# Patient Record
Sex: FEMALE | Race: WHITE | NOT HISPANIC OR LATINO | URBAN - METROPOLITAN AREA
[De-identification: names, ages, dates, MRNs, and addresses within clinical notes are randomized per-mention and may not be internally consistent; named-entity substitution may affect disease eponyms.]

---

## 2020-01-16 ENCOUNTER — TRANSFERRED RECORDS (OUTPATIENT)
Dept: HEALTH INFORMATION MANAGEMENT | Facility: CLINIC | Age: 75
End: 2020-01-16

## 2020-02-27 ENCOUNTER — APPOINTMENT (OUTPATIENT)
Age: 75
Setting detail: DERMATOLOGY
End: 2020-02-27

## 2020-02-27 DIAGNOSIS — L81.4 OTHER MELANIN HYPERPIGMENTATION: ICD-10-CM

## 2020-02-27 DIAGNOSIS — L57.8 OTHER SKIN CHANGES DUE TO CHRONIC EXPOSURE TO NONIONIZING RADIATION: ICD-10-CM

## 2020-02-27 DIAGNOSIS — L72.11 PILAR CYST: ICD-10-CM

## 2020-02-27 DIAGNOSIS — L57.0 ACTINIC KERATOSIS: ICD-10-CM

## 2020-02-27 DIAGNOSIS — D22 MELANOCYTIC NEVI: ICD-10-CM

## 2020-02-27 DIAGNOSIS — L82.1 OTHER SEBORRHEIC KERATOSIS: ICD-10-CM

## 2020-02-27 DIAGNOSIS — D18.0 HEMANGIOMA: ICD-10-CM

## 2020-02-27 PROBLEM — D18.01 HEMANGIOMA OF SKIN AND SUBCUTANEOUS TISSUE: Status: ACTIVE | Noted: 2020-02-27

## 2020-02-27 PROBLEM — D22.5 MELANOCYTIC NEVI OF TRUNK: Status: ACTIVE | Noted: 2020-02-27

## 2020-02-27 PROCEDURE — OTHER COUNSELING: OTHER

## 2020-02-27 PROCEDURE — 17004 DESTROY PREMAL LESIONS 15/>: CPT

## 2020-02-27 PROCEDURE — 99214 OFFICE O/P EST MOD 30 MIN: CPT | Mod: 25

## 2020-02-27 PROCEDURE — OTHER LIQUID NITROGEN: OTHER

## 2020-02-27 ASSESSMENT — LOCATION DETAILED DESCRIPTION DERM
LOCATION DETAILED: LEFT CENTRAL PARIETAL SCALP
LOCATION DETAILED: LEFT MEDIAL UPPER BACK
LOCATION DETAILED: INFERIOR THORACIC SPINE
LOCATION DETAILED: SUPERIOR THORACIC SPINE
LOCATION DETAILED: RIGHT RADIAL DORSAL HAND
LOCATION DETAILED: LEFT PROXIMAL DORSAL FOREARM
LOCATION DETAILED: LEFT FOREHEAD
LOCATION DETAILED: RIGHT PROXIMAL DORSAL FOREARM
LOCATION DETAILED: RIGHT SUPERIOR HELIX
LOCATION DETAILED: RIGHT SUPERIOR CRUS OF ANTIHELIX

## 2020-02-27 ASSESSMENT — LOCATION SIMPLE DESCRIPTION DERM
LOCATION SIMPLE: LEFT FOREARM
LOCATION SIMPLE: LEFT UPPER BACK
LOCATION SIMPLE: RIGHT FOREARM
LOCATION SIMPLE: LEFT FOREHEAD
LOCATION SIMPLE: UPPER BACK
LOCATION SIMPLE: RIGHT EAR
LOCATION SIMPLE: SCALP
LOCATION SIMPLE: RIGHT HAND

## 2020-02-27 ASSESSMENT — LOCATION ZONE DERM
LOCATION ZONE: HAND
LOCATION ZONE: SCALP
LOCATION ZONE: ARM
LOCATION ZONE: EAR
LOCATION ZONE: FACE
LOCATION ZONE: TRUNK

## 2020-02-27 NOTE — PROCEDURE: LIQUID NITROGEN
Render Post-Care Instructions In Note?: no
Detail Level: Zone
Duration Of Freeze Thaw-Cycle (Seconds): 0
Consent: The patient's consent was obtained including but not limited to risks of crusting, scabbing, blistering, scarring, darker or lighter pigmentary change, recurrence, incomplete removal and infection.
Total Number Of Aks Treated: 8
Post-Care Instructions: I reviewed with the patient in detail post-care instructions. Patient is to wear sunprotection, and avoid picking at any of the treated lesions. Pt may apply Vaseline to crusted or scabbing areas.

## 2020-02-27 NOTE — PROCEDURE: COUNSELING
Detail Level: Zone
Detail Level: Generalized
Detail Level: Detailed
Patient Specific Counseling (Will Not Stick From Patient To Patient): 8 lesions treated as noted below

## 2020-05-19 ENCOUNTER — TRANSFERRED RECORDS (OUTPATIENT)
Dept: HEALTH INFORMATION MANAGEMENT | Facility: CLINIC | Age: 75
End: 2020-05-19

## 2020-05-28 ENCOUNTER — TRANSFERRED RECORDS (OUTPATIENT)
Dept: HEALTH INFORMATION MANAGEMENT | Facility: CLINIC | Age: 75
End: 2020-05-28

## 2020-05-28 ENCOUNTER — MEDICAL CORRESPONDENCE (OUTPATIENT)
Dept: HEALTH INFORMATION MANAGEMENT | Facility: CLINIC | Age: 75
End: 2020-05-28

## 2020-06-04 ENCOUNTER — VIRTUAL VISIT (OUTPATIENT)
Dept: NEUROLOGY | Facility: CLINIC | Age: 75
End: 2020-06-04
Payer: COMMERCIAL

## 2020-06-04 ENCOUNTER — ANCILLARY PROCEDURE (OUTPATIENT)
Dept: NEUROLOGY | Facility: CLINIC | Age: 75
End: 2020-06-04
Payer: COMMERCIAL

## 2020-06-04 DIAGNOSIS — G40.919 INTRACTABLE EPILEPSY WITHOUT STATUS EPILEPTICUS, UNSPECIFIED EPILEPSY TYPE (H): Primary | ICD-10-CM

## 2020-06-04 DIAGNOSIS — R40.4 TRANSIENT ALTERATION OF AWARENESS: ICD-10-CM

## 2020-06-04 RX ORDER — LEVETIRACETAM 250 MG/1
TABLET ORAL
Qty: 150 TABLET | Refills: 11 | Status: SHIPPED | OUTPATIENT
Start: 2020-06-04 | End: 2020-09-16

## 2020-06-04 NOTE — LETTER
Date:June 5, 2020      Patient was self referred, no letter generated. Do not send.        [facility] Health Information

## 2020-06-04 NOTE — LETTER
"2020       RE: Kindra Holt  : 1945   MRN: [id number]      Dear Colleague,    Thank you for referring your patient, Kindra Holt, to the [facility]PI[facility] at [facility]. Please see a copy of my visit note below.    Kindra Holt is a 74 year old female who is being evaluated via a billable video visit.      The patient has been notified of following:     \"This video visit will be conducted via a call between you and your physician/provider. We have found that certain health care needs can be provided without the need for an in-person physical exam.  This service lets us provide the care you need with a video conversation.  If a prescription is necessary we can send it directly to your pharmacy.  If lab work is needed we can place an order for that and you can then stop by our lab to have the test done at a later time.    Video visits are billed at different rates depending on your insurance coverage.  Please reach out to your insurance provider with any questions.    If during the course of the call the physician/provider feels a video visit is not appropriate, you will not be charged for this service.\"    Patient has given verbal consent for Video visit? Yes    How would you like to obtain your AVS? Mail a copy    Patient would like the video invitation sent by: Send to e-mail at: [email or web address]  Will anyone else be joining your video visit? Yes: [email or web address]. How would they like to receive their invitation? Send to e-mail at:  [email or web address]               Video-Visit Details    Type of service:  Video Visit    Video Start Time: 2:49 PM  Video End Time: 3:34 PM    Originating Location (pt. Location): Home    Distant Location (provider location):  [facility]     Platform used for Video Visit: Mar Frazier MD      [facility] History and Physical       Patient:  Kindra Holt  :  1945   Age:  " "74 year old   Today's Office Visit:  6/4/2020    Referring Provider:    Provider Not In System    History of Present Illness:  Visit with Alexsandra (home lead). Kindra is a 73 y/o RH with history of epilepsy. Alexsandra does not know details of epilepsy history. Dr. Graham at [place] was her doctor. She continues to have recurrent episodes. She moved into current home 10/30/2019. Prior to 10.2019 she lived alone at an assisted living facility. Her brother (guardian) may know her epilepsy history. Per Geovany her seizure started at age 65, early on seizure semiology are unknown per brother. She has only had levetiracetam for antiepileptic drug.     At new home she had a confusion spell on 11/2019, second spell was 2/2020 once per month, then spells have worsened in the last 2 months.  In the last 2 months she had 3 spells total, lasting several hours. Last spell was  May 21, 2020.  She started levetiracetam before she moved to the home.   Spell Type 1: These are described as  \"odd spells, vomited several times, dud not respond, eyes were closed, increased drooling, very confused, did not follow commands, she is not able remember how to turn on faucet, speech slurred\".  No clear warning sign. These spells last several hours to most of the day, periods of severe confusion are hours in duration. No fevers, no hypoglycemia, BG is around 129 when the check at home, BP was stable, no chest pain, no shortness of breath.   These occur early in the morning.   Epilepsy Risk Factors:  No Stroke, Encephalitis, Meningitis, Brain Tumor and Head Trauma  Precipitating factors:   NONE  Past medical history: DM 2, CKD, Epilepsy (we do not know details). No history of surgery.   Family History:  None    Social: Lives at [facility] (group home for adults), no smoking, no drugs, no alcohol. Highest level of education 8th grade. brother is her guardian.  Driving:  Currently patient is:  Not driving.  Female:  Menopause, no hot flashes, no " night sweats    Previous Evaluations for Epilepsy: Past workup was EKG, Ziopatch 2 weeks had no cardiac abnormality, MRI brain and EEG: We do not results.   Review of Systems:  Lethargy / Tiredness:  No  Nausea / Vomiting:  No  Double Vision:  No  Sleepiness:  No  Depression:  No  Slowed Cognitive Function:  No  Memory Problems:  yes  Poor Balance:  No  Dizziness:  No  Appetite Changes:  No  Blurred Vision:  No  Decreased Libido:  No  Sleep Changes:  No  Behavioral Changes:  No  Skin: negative  Respiratory: No shortness of breath, dyspnea on exertion, cough, or hemoptysis  Cardiovascular: negative  Have you experienced a traumatic fall related to your events: No  Are these falls related to your seizures: No     Medication Notes:   AED Medication Compliance:  compliant most of the time  Using a pill box:  Medications are given to her by staff    Exam: Alert, slow to answer questions, orientated, speech is fluent, face symmetric, tongue midline, extra ocular movements in tact, no pronator drip, arm circumduction is symmetric, finger to nose normal.     Impression:   Recurrent stereotyped spells concerning for focal impaired seizure   History of epilepsy   History of DM and CKD    Discussion: 74 year old RHF with history of epilepsy, DM, and CKD new to our clinic and group home, she has been on levetiracetam for a long time and has a diagnosis of epilepsy. We do not have details of her prior medical history. She continues to have spells of confusion, which I suspect are seizures. We will increase levetiracetam 500-750. Most spells are in the early morning. She had a cardiac work up (EKG and ziopatch with no concerning abnormalities to explain her spells). EEG today 6/4/2020 was normal. We need to get past medical records to better understand her history.     Plan:   Get levetiracetam level   Increase levetiracetam 500-750   Dr. Graham at [place] need records.       Deneen Frazier MD   Epilepsy Staff    Again, thank  you for allowing me to participate in the care of your patient.      Sincerely,    Deneen Frazier MD

## 2020-06-04 NOTE — PROGRESS NOTES
"Kindra Holt is a 74 year old female who is being evaluated via a billable video visit.      The patient has been notified of following:     \"This video visit will be conducted via a call between you and your physician/provider. We have found that certain health care needs can be provided without the need for an in-person physical exam.  This service lets us provide the care you need with a video conversation.  If a prescription is necessary we can send it directly to your pharmacy.  If lab work is needed we can place an order for that and you can then stop by our lab to have the test done at a later time.    Video visits are billed at different rates depending on your insurance coverage.  Please reach out to your insurance provider with any questions.    If during the course of the call the physician/provider feels a video visit is not appropriate, you will not be charged for this service.\"    Patient has given verbal consent for Video visit? Yes    How would you like to obtain your AVS? Mail a copy    Patient would like the video invitation sent by: Send to e-mail at: [email or web address]  Will anyone else be joining your video visit? Yes: [email or web address]. How would they like to receive their invitation? Send to e-mail at:  [email or web address]               Video-Visit Details    Type of service:  Video Visit    Video Start Time: 2:49 PM  Video End Time: 3:34 PM    Originating Location (pt. Location): Home    Distant Location (provider location):  [facility] EPILEPS[facility]     Platform used for Video Visit: Mar Frazier MD      [facility] Epilepsy Care History and Physical       Patient:  Kindra Holt  :  1945   Age:  74 year old   Today's Office Visit:  2020    Referring Provider:    Provider Not In System    History of Present Illness:  Visit with Alexsandra (home lead). Kindra is a 75 y/o RH with history of epilepsy. Alexsandra does not know details of epilepsy history. Dr. Graham at " [facility] was her doctor. She continues to have recurrent episodes. She moved into current home 10/30/2019. Prior to 10.2019 she lived alone at an assisted living facility. Her brother (guardian) may know her epilepsy history. Per Geovany her seizure started at age 65, early on seizure semiology are unknown per brother. She has only had levetiracetam for antiepileptic drug.     At new home she had a confusion spell on 11/2019, second spell was 2/2020 once per month, then spells have worsened in the last 2 months.  In the last 2 months she had 3 spells total, lasting several hours. Last spell was  May 21, 2020.  She started levetiracetam before she moved to the home.   Spell Type 1: These are described as  \"odd spells, vomited several times, dud not respond, eyes were closed, increased drooling, very confused, did not follow commands, she is not able remember how to turn on faucet, speech slurred\".  No clear warning sign. These spells last several hours to most of the day, periods of severe confusion are hours in duration. No fevers, no hypoglycemia, BG is around 129 when the check at home, BP was stable, no chest pain, no shortness of breath.   These occur early in the morning.   Epilepsy Risk Factors:  No Stroke, Encephalitis, Meningitis, Brain Tumor and Head Trauma  Precipitating factors:   NONE  Past medical history: DM 2, CKD, Epilepsy (we do not know details). No history of surgery.   Family History:  None    Social: Lives at [facility] (group home for adults), no smoking, no drugs, no alcohol. Highest level of education 8th grade. brother is her guardian.  Driving:  Currently patient is:  Not driving.  Female:  Menopause, no hot flashes, no night sweats    Previous Evaluations for Epilepsy: Past workup was EKG, Ziopatch 2 weeks had no cardiac abnormality, MRI brain and EEG: We do not results.   Review of Systems:  Lethargy / Tiredness:  No  Nausea / Vomiting:  No  Double Vision:  No  Sleepiness:  " No  Depression:  No  Slowed Cognitive Function:  No  Memory Problems:  yes  Poor Balance:  No  Dizziness:  No  Appetite Changes:  No  Blurred Vision:  No  Decreased Libido:  No  Sleep Changes:  No  Behavioral Changes:  No  Skin: negative  Respiratory: No shortness of breath, dyspnea on exertion, cough, or hemoptysis  Cardiovascular: negative  Have you experienced a traumatic fall related to your events: No  Are these falls related to your seizures: No     Medication Notes:   AED Medication Compliance:  compliant most of the time  Using a pill box:  Medications are given to her by staff    Exam: Alert, slow to answer questions, orientated, speech is fluent, face symmetric, tongue midline, extra ocular movements in tact, no pronator drip, arm circumduction is symmetric, finger to nose normal.     Impression:   Recurrent stereotyped spells concerning for focal impaired seizure   History of epilepsy   History of DM and CKD    Discussion: 74 year old RHF with history of epilepsy, DM, and CKD new to our clinic and group home, she has been on levetiracetam for a long time and has a diagnosis of epilepsy. We do not have details of her prior medical history. She continues to have spells of confusion, which I suspect are seizures. We will increase levetiracetam 500-750. Most spells are in the early morning. She had a cardiac work up (EKG and ziopatch with no concerning abnormalities to explain her spells). EEG today 6/4/2020 was normal. We need to get past medical records to better understand her history.     Plan:   Get levetiracetam level   Increase levetiracetam 500-750   Dr. Graham at [place] need records.       Deneen Frazier MD   Epilepsy Staff

## 2020-06-08 ENCOUNTER — TELEPHONE (OUTPATIENT)
Dept: NEUROLOGY | Facility: CLINIC | Age: 75
End: 2020-06-08

## 2020-06-08 NOTE — TELEPHONE ENCOUNTER
Call returned to the patient's group home. I asked that they notify us on Kindra's discharge so that we can arrange the appropriate follow up. An appointment is already scheduled later this month with Dr. Frazier, but perhaps an EEG or labs will also be needed.

## 2020-06-08 NOTE — TELEPHONE ENCOUNTER
What is the concern that needs to be addressed by a nurse? Patient was taken to the [facility] this morning for another episode. They are unsure if this is a seizure or not. They just wanted to let you know. Please call them back with questions or concerns    May a detailed message be left on voicemail? yes    Date of last office visit:     Message routed to: RN pool

## 2020-06-29 ENCOUNTER — VIRTUAL VISIT (OUTPATIENT)
Dept: NEUROLOGY | Facility: CLINIC | Age: 75
End: 2020-06-29
Payer: COMMERCIAL

## 2020-06-29 DIAGNOSIS — G40.919 INTRACTABLE EPILEPSY WITHOUT STATUS EPILEPTICUS, UNSPECIFIED EPILEPSY TYPE (H): ICD-10-CM

## 2020-06-29 DIAGNOSIS — R40.4 TRANSIENT ALTERATION OF AWARENESS: Primary | ICD-10-CM

## 2020-06-29 RX ORDER — LAMOTRIGINE 25 MG/1
TABLET ORAL
Qty: 120 TABLET | Refills: 3 | Status: SHIPPED | OUTPATIENT
Start: 2020-06-29 | End: 2020-08-28

## 2020-06-29 NOTE — LETTER
Date:June 30, 2020      Patient was self referred, no letter generated. Do not send.        [facility] Health Information

## 2020-06-29 NOTE — PATIENT INSTRUCTIONS
Continue levetiracetam 500 mg and 750 mg pm     Start lamotrigine (watch for rash, mood changes, dizziness, falls, etc)  Week 1-2: 25 mg morning   Week 3-4: 25 mg twice a day   Week 5: 50 mg and 25 mg pm   Week 6: 50 mg twice a day     Follow up  6 weeks    Deneen Frazier MD

## 2020-06-29 NOTE — LETTER
"2020       RE: Kindra Holt  : 1945   MRN: [id number]      Dear Colleague,    Thank you for referring your patient, Kindra Holt, to the [facility] EPILEPSY [facility]ARE at [facility]. Please see a copy of my visit note below.    Kindra Holt is a 74 year old female who is being evaluated via a billable video visit.      The patient has been notified of following:     \"This video visit will be conducted via a call between you and your physician/provider. We have found that certain health care needs can be provided without the need for an in-person physical exam.  This service lets us provide the care you need with a video conversation.  If a prescription is necessary we can send it directly to your pharmacy.  If lab work is needed we can place an order for that and you can then stop by our lab to have the test done at a later time.    Video visits are billed at different rates depending on your insurance coverage.  Please reach out to your insurance provider with any questions.    If during the course of the call the physician/provider feels a video visit is not appropriate, you will not be charged for this service.\"    Patient has given verbal consent for Video visit? Yes  How would you like to obtain your AVS? Mail a copy  Patient would like the video invitation sent by: Send to e-mail at: [email or web address]  Will anyone else be joining your video visit? No, pt and caregiver.     Thank you  Muriel Hebert LPN    Takes other meds for other stuff per caregiver. Would not give me the rest of the meds.     Video-Visit Details    Type of service:  Video Visit    Video Start Time: 9:50 AM  Video End Time: 10:15 AM    Originating Location (pt. Location): Home    Distant Location (provider location):  [facility] EPILEPS[facility]     Platform used for Video Visit: Mar Frazier MD    [facility] Epilepsy Progress Note       Patient:  Kindra Holt  :  " "1945   Age:  74 year old   Today's Office Visit:  6/29/2020    HPI: Joined with staff member (Luciaarash).June 6, 2020. She took her medications and fell backward, hit her head, and had a convulsion. She went to ER and was told she was dehydrated. She had a UTI and was put on a sulfa medications, she had a rash all over body. She has poor memory, misrepresents things, does her ADL out of order, more argumentative, mood is worse, more stubborn which is not her baseline. Her mood worsened with levetiracetam increase.      Spell Type 1: These are described as  \"odd spells, vomited several times, dud not respond, eyes were closed, increased drooling, very confused, did not follow commands, she is not able remember how to turn on faucet, speech slurred\".  No clear warning sign. These spells last several hours to most of the day, periods of severe confusion are hours in duration.    On this visit we spoke about patient's seizures, antiepileptic drug, and plan of epilepsy are. Patient/caregiver was agreeable with plan of care.     Prior to Admission medications    Medication Sig Start Date End Date Taking? Authorizing Provider   levETIRAcetam (KEPPRA) 250 MG tablet 500 mg morning and 750 mg pm 6/4/20  Yes Deneen Frazier MD       Review of System: No nausea, no vomiting, no rash, no significant mood changes.     Exam:   Alert, orientated, speech is fluent, face symmetric, tongue midline, extra ocular movements in tact, no pronator drip, arm circumduction is symmetric, finger to nose normal.     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  In the past she has had episodes of slurred speech and confusion which were thought to be possible TIAs.  However MRI of the brain's were unremarkable for stroke or acute changes.  She continued to have recurrent paroxysmal stereotyped spells consisting of convulsion which were thought to be focal impaired seizures.  She has been on monotherapy levetiracetam.  We increased her levetiracetam on the last visit which resulted in worsening mood.  Her mood changes have interfered with her activities of daily living.  For this reason we will start lamotrigine and titrate to tolerable doses.  Then reduce levetiracetam. She had convulsion with fall 6/6/2020.  we reviewed the side effects, which include,  but are not limited to malachi caren rash, hepatotoxicity, leukopenia, mood changes, nausea, cognitive changes, and dizziness.  They are agreeable to starting lamotrigine.      Plan:   Continue levetiracetam 500 mg and 750 mg pm     Start lamotrigine (watch for rash, mood changes, dizziness, falls, etc)  Week 1-2: 25 mg morning   Week 3-4: 25 mg twice a day   Week 5: 50 mg and 25 mg pm   Week 6: 50 mg twice a day     Follow up  6 weeks      I spent 25 minutes with the patient. During this time medical history data collection, counseling, and coordination of care exceeded 50% of the visit time. I addressed all questions the patient/caregiver raised in regards to the patient's medical care. This note was created with voice recognition software. Inadvertent grammatical errors, typographical errors, and \"sound a like\" substitutions may occur due to limitations of the software.  Read the note carefully and apply context when erroneous substitutions have occurred. Thank you.     Deneen Frazier MD                 Again, thank you for allowing me to participate in the care of your patient.      Sincerely,    Deneen Frazier MD      "

## 2020-06-29 NOTE — PROGRESS NOTES
"Kindra Holt is a 74 year old female who is being evaluated via a billable video visit.      The patient has been notified of following:     \"This video visit will be conducted via a call between you and your physician/provider. We have found that certain health care needs can be provided without the need for an in-person physical exam.  This service lets us provide the care you need with a video conversation.  If a prescription is necessary we can send it directly to your pharmacy.  If lab work is needed we can place an order for that and you can then stop by our lab to have the test done at a later time.    Video visits are billed at different rates depending on your insurance coverage.  Please reach out to your insurance provider with any questions.    If during the course of the call the physician/provider feels a video visit is not appropriate, you will not be charged for this service.\"    Patient has given verbal consent for Video visit? Yes  How would you like to obtain your AVS? Mail a copy  Patient would like the video invitation sent by: Send to e-mail at: [email or web address]  Will anyone else be joining your video visit? No, pt and caregiver.     Thank you  Muriel Hebert LPN    Takes other meds for other stuff per caregiver. Would not give me the rest of the meds.     Video-Visit Details    Type of service:  Video Visit    Video Start Time: 9:50 AM  Video End Time: 10:15 AM    Originating Location (pt. Location): Home    Distant Location (provider location):  [facility] EPILEPSY CARE     Platform used for Video Visit: Mar Frazier MD    [facility] Epilepsy Progress Note       Patient:  Kindra Holt  :  1945   Age:  74 year old   Today's Office Visit:  2020    HPI: Joined with staff member (Aden).2020. She took her medications and fell backward, hit her head, and had a convulsion. She went to ER and was told she was dehydrated. She had a UTI and was put on a " "sulfa medications, she had a rash all over body. She has poor memory, misrepresents things, does her ADL out of order, more argumentative, mood is worse, more stubborn which is not her baseline. Her mood worsened with levetiracetam increase.      Spell Type 1: These are described as  \"odd spells, vomited several times, dud not respond, eyes were closed, increased drooling, very confused, did not follow commands, she is not able remember how to turn on faucet, speech slurred\".  No clear warning sign. These spells last several hours to most of the day, periods of severe confusion are hours in duration.    On this visit we spoke about patient's seizures, antiepileptic drug, and plan of epilepsy are. Patient/caregiver was agreeable with plan of care.     Prior to Admission medications    Medication Sig Start Date End Date Taking? Authorizing Provider   levETIRAcetam (KEPPRA) 250 MG tablet 500 mg morning and 750 mg pm 6/4/20  Yes Deneen Frazier MD       Review of System: No nausea, no vomiting, no rash, no significant mood changes.     Exam:   Alert, orientated, speech is fluent, face symmetric, tongue midline, extra ocular movements in tact, no pronator drip, arm circumduction is symmetric, finger to nose normal.     Impression:   Recurrent stereotyped spells concerning for focal impaired seizure that progress to generalized tonic-clonic convulsion   History of epilepsy   History of DM and CKD  History mild cognitive impairment and mild mental retardation   History of questionable TIA (slurred speech with no acute changes on MRI brain, these may have been seizures)    Discussion:   Discussion: 74 year old RHF with history of epilepsy, DM, and CKD on monotherapy levetiracetam for diagnosis of epilepsy.  In the past she has had episodes of slurred speech and confusion which were thought to be possible TIAs.  However MRI of the brain's were unremarkable for stroke or acute changes.  She continued to have recurrent " "paroxysmal stereotyped spells consisting of convulsion which were thought to be focal impaired seizures.  She has been on monotherapy levetiracetam.  We increased her levetiracetam on the last visit which resulted in worsening mood.  Her mood changes have interfered with her activities of daily living.  For this reason we will start lamotrigine and titrate to tolerable doses.  Then reduce levetiracetam. She had convulsion with fall 6/6/2020.  we reviewed the side effects, which include,  but are not limited to malachi caren rash, hepatotoxicity, leukopenia, mood changes, nausea, cognitive changes, and dizziness.  They are agreeable to starting lamotrigine.      Plan:   Continue levetiracetam 500 mg and 750 mg pm     Start lamotrigine (watch for rash, mood changes, dizziness, falls, etc)  Week 1-2: 25 mg morning   Week 3-4: 25 mg twice a day   Week 5: 50 mg and 25 mg pm   Week 6: 50 mg twice a day     Follow up  6 weeks      I spent 25 minutes with the patient. During this time medical history data collection, counseling, and coordination of care exceeded 50% of the visit time. I addressed all questions the patient/caregiver raised in regards to the patient's medical care. This note was created with voice recognition software. Inadvertent grammatical errors, typographical errors, and \"sound a like\" substitutions may occur due to limitations of the software.  Read the note carefully and apply context when erroneous substitutions have occurred. Thank you.     Deneen Frazier MD               "

## 2020-08-12 ENCOUNTER — VIRTUAL VISIT (OUTPATIENT)
Dept: NEUROLOGY | Facility: CLINIC | Age: 75
End: 2020-08-12
Payer: COMMERCIAL

## 2020-08-12 DIAGNOSIS — G40.919 INTRACTABLE EPILEPSY WITHOUT STATUS EPILEPTICUS, UNSPECIFIED EPILEPSY TYPE (H): Primary | ICD-10-CM

## 2020-08-12 RX ORDER — SIMETHICONE 80 MG
80 TABLET,CHEWABLE ORAL 3 TIMES DAILY
COMMUNITY

## 2020-08-12 RX ORDER — SIMVASTATIN 20 MG
20 TABLET ORAL AT BEDTIME
COMMUNITY

## 2020-08-12 RX ORDER — LEVOTHYROXINE SODIUM 50 UG/1
50 TABLET ORAL DAILY
COMMUNITY
End: 2023-05-15

## 2020-08-12 RX ORDER — LISINOPRIL 40 MG/1
40 TABLET ORAL DAILY
COMMUNITY

## 2020-08-12 RX ORDER — ASPIRIN 325 MG/1
325 TABLET, FILM COATED ORAL DAILY
COMMUNITY

## 2020-08-12 RX ORDER — MULTIPLE VITAMINS W/ MINERALS TAB 9MG-400MCG
1 TAB ORAL DAILY
COMMUNITY

## 2020-08-12 NOTE — PATIENT INSTRUCTIONS
At this time we will stop lamotrigine titration, continue lamotrigine 50 mg twice a day. Give benadryl 25 mg twice a day for 1 week or until rash resolved and topical steroid cream. I suspect skin redness may be sun exposed/sensitivity.     Continue levetiracetam 500 mg and 750 mg pm     Continue lamotrigine 50 mg twice a day     Follow up 2 weeks    Send picture of rash on my chart     Deneen Frazier MD

## 2020-08-12 NOTE — PROGRESS NOTES
"Kindra Holt is a 74 year old female who is being evaluated via a billable video visit.      The patient has been notified of following:     \"This video visit will be conducted via a call between you and your physician/provider. We have found that certain health care needs can be provided without the need for an in-person physical exam.  This service lets us provide the care you need with a video conversation.  If a prescription is necessary we can send it directly to your pharmacy.  If lab work is needed we can place an order for that and you can then stop by our lab to have the test done at a later time.    Video visits are billed at different rates depending on your insurance coverage.  Please reach out to your insurance provider with any questions.    If during the course of the call the physician/provider feels a video visit is not appropriate, you will not be charged for this service.\"    Patient has given verbal consent for Video visit? Yes  How would you like to obtain your AVS? Mail a copy Fax to [phone]  If you are dropped from the video visit, the video invite should be resent to: Send to e-mail at: No e-mail address on record  Will anyone else be joining your video visit? No        Video-Visit Details    Type of service:  Video Visit     Video Start Time: 2:20 PM  Video End Time: 2:52 PM    Originating Location (pt. Location): Home    Distant Location (provider location):  [facility] EPILEPSY CARE     Platform used for Video Visit: Mar Frazier MD        [facility] Epilepsy Progress Note       Patient:  Kindra Holt  :  1945   Age:  74 year old   Today's Office Visit: 2020    HPI: Joined with staff member (Aden). On last visit we started lamotrigine. Since starting lamotrigine no dizziness or falls. She has redness on her skin mainly around her neck, sun exposed areas. She has no rash on areas covered by shirt. I asked them to take picture. No oral lesion, no blood in " "urine, no ulcers on skin.  She is on lamotrigine 50 mg twice a day. Lamotrigine was increased from 25-50 on 8/5/2020, they noticed the rash on 7/22/2020 and it has gradually worsened. She had a sulfa reaction to an antibiotic 6/8/2020, her skin had hives, she was hospitalized for 3 days. Her sulfa rash resolved mid June. She started lamotrigine 7/1/2020.       Her mood is improved compared to prior visit. Last spell was 6/8/2020 with UTI. The lamotrigine is helping, however, she has redness on skin.     Spell Type 1: These are described as  \"odd spells, vomited several times, dud not respond, eyes were closed, increased drooling, very confused, did not follow commands, she is not able remember how to turn on faucet, speech slurred\".  No clear warning sign. Before June 2020 she had these spells daily.     On this visit we spoke about patient's seizures, antiepileptic drug, and plan of epilepsy are. Patient/caregiver was agreeable with plan of care.     Prior to Admission medications    Medication Sig Start Date End Date Taking? Authorizing Provider   levETIRAcetam (KEPPRA) 250 MG tablet 500 mg morning and 750 mg pm 6/4/20  Yes Deneen Frazier MD       Review of System: No nausea, no vomiting, + rash, no significant mood changes.     Exam:   Alert, orientated, speech is fluent, face symmetric, tongue midline, extra ocular movements in tact, skin has redness on cheeks, neck, neckline above shirt.     Impression:   Recurrent stereotyped spells concerning for focal impaired seizure that progress to generalized tonic-clonic convulsion     Rash with sulfa 6/8/2020 and now red skin after starting lamotrigine 7/1/2020    History of epilepsy   History of DM and CKD  History mild cognitive impairment and mild mental retardation   History of questionable TIA (slurred speech with no acute changes on MRI brain, these may have been seizures)    Discussion:   Discussion: 74 year old RHF with history of epilepsy, DM, and CKD on " "monotherapy levetiracetam for diagnosis of epilepsy.  In the past she has had episodes of slurred speech and confusion which were thought to be possible TIAs.  However MRI of the brain's were unremarkable for stroke or acute changes.  She stopped having paroxysmal stereotyped spells with lamotrigine, last spell (possible complex partial seizure) 6/8/2020. Last convulsion was 6/6/2020. However, she had a sulfa rash on 6/8/2020 that was severe. Started lamotrigine 7/1/2020 and her skin now has redness in sun exposed area only above shirt line. I suspect this may be sensitivity to sun exposure or an overactive immune response from previous sulfa rash. Since lamotrigine is working, we will continue 50 mg twice a day for now with close follow up.     At this time we will stop lamotrigine titration, continue lamotrigine 50 mg twice a day. Give benadryl 25 mg twice a day for 1 week or until rash resolved and topical steroid cream. I suspect skin redness may be sun exposed/sensitivity.       Plan:   Continue levetiracetam 500 mg and 750 mg pm     Continue lamotrigine 50 mg twice a day     Follow up 2 weeks    Send picture of rash on my chart     I spent 25 minutes with the patient. During this time medical history data collection, counseling, and coordination of care exceeded 50% of the visit time. I addressed all questions the patient/caregiver raised in regards to the patient's medical care. This note was created with voice recognition software. Inadvertent grammatical errors, typographical errors, and \"sound a like\" substitutions may occur due to limitations of the software.  Read the note carefully and apply context when erroneous substitutions have occurred. Thank you.     Deneen Frazier MD                 "

## 2020-08-12 NOTE — LETTER
Date:August 17, 2020      Patient was self referred, no letter generated. Do not send.        [facility] Health Information

## 2020-08-12 NOTE — LETTER
"2020       RE: Kindra Holt  : 1945   MRN: [id number]      Dear Colleague,    Thank you for referring your patient, Kindra Holt, to the [facility] EPILEPSY CARE at [facility]. Please see a copy of my visit note below.    Kindra Holt is a 74 year old female who is being evaluated via a billable video visit.      The patient has been notified of following:     \"This video visit will be conducted via a call between you and your physician/provider. We have found that certain health care needs can be provided without the need for an in-person physical exam.  This service lets us provide the care you need with a video conversation.  If a prescription is necessary we can send it directly to your pharmacy.  If lab work is needed we can place an order for that and you can then stop by our lab to have the test done at a later time.    Video visits are billed at different rates depending on your insurance coverage.  Please reach out to your insurance provider with any questions.    If during the course of the call the physician/provider feels a video visit is not appropriate, you will not be charged for this service.\"    Patient has given verbal consent for Video visit? Yes  How would you like to obtain your AVS? Mail a copy Fax to [phone]  If you are dropped from the video visit, the video invite should be resent to: Send to e-mail at: No e-mail address on record  Will anyone else be joining your video visit? No        Video-Visit Details    Type of service:  Video Visit     Video Start Time: 2:20 PM  Video End Time: 2:52 PM    Originating Location (pt. Location): Home    Distant Location (provider location):  [facility] EPILEPSY CARE     Platform used for Video Visit: Mar Frazier MD        [facility] Epilepsy Progress Note       Patient:  Kindra Holt  :  1945   Age:  74 year old   Today's Office Visit: 2020    HPI: Joined with staff " "member (Aden). On last visit we started lamotrigine. Since starting lamotrigine no dizziness or falls. She has redness on her skin mainly around her neck, sun exposed areas. She has no rash on areas covered by shirt. I asked them to take picture. No oral lesion, no blood in urine, no ulcers on skin.  She is on lamotrigine 50 mg twice a day. Lamotrigine was increased from 25-50 on 8/5/2020, they noticed the rash on 7/22/2020 and it has gradually worsened. She had a sulfa reaction to an antibiotic 6/8/2020, her skin had hives, she was hospitalized for 3 days. Her sulfa rash resolved mid June. She started lamotrigine 7/1/2020.       Her mood is improved compared to prior visit. Last spell was 6/8/2020 with UTI. The lamotrigine is helping, however, she has redness on skin.     Spell Type 1: These are described as  \"odd spells, vomited several times, dud not respond, eyes were closed, increased drooling, very confused, did not follow commands, she is not able remember how to turn on faucet, speech slurred\".  No clear warning sign. Before June 2020 she had these spells daily.     On this visit we spoke about patient's seizures, antiepileptic drug, and plan of epilepsy are. Patient/caregiver was agreeable with plan of care.     Prior to Admission medications    Medication Sig Start Date End Date Taking? Authorizing Provider   levETIRAcetam (KEPPRA) 250 MG tablet 500 mg morning and 750 mg pm 6/4/20  Yes Deneen Frazier MD       Review of System: No nausea, no vomiting, + rash, no significant mood changes.     Exam:   Alert, orientated, speech is fluent, face symmetric, tongue midline, extra ocular movements in tact, skin has redness on cheeks, neck, neckline above shirt.     Impression:   Recurrent stereotyped spells concerning for focal impaired seizure that progress to generalized tonic-clonic convulsion     Rash with sulfa 6/8/2020 and now red skin after starting lamotrigine 7/1/2020    History of epilepsy   History of " "DM and CKD  History mild cognitive impairment and mild mental retardation   History of questionable TIA (slurred speech with no acute changes on MRI brain, these may have been seizures)    Discussion:   Discussion: 74 year old RHF with history of epilepsy, DM, and CKD on monotherapy levetiracetam for diagnosis of epilepsy.  In the past she has had episodes of slurred speech and confusion which were thought to be possible TIAs.  However MRI of the brain's were unremarkable for stroke or acute changes.  She stopped having paroxysmal stereotyped spells with lamotrigine, last spell (possible complex partial seizure) 6/8/2020. Last convulsion was 6/6/2020. However, she had a sulfa rash on 6/8/2020 that was severe. Started lamotrigine 7/1/2020 and her skin now has redness in sun exposed area only above shirt line. I suspect this may be sensitivity to sun exposure or an overactive immune response from previous sulfa rash. Since lamotrigine is working, we will continue 50 mg twice a day for now with close follow up.     At this time we will stop lamotrigine titration, continue lamotrigine 50 mg twice a day. Give benadryl 25 mg twice a day for 1 week or until rash resolved and topical steroid cream. I suspect skin redness may be sun exposed/sensitivity.       Plan:   Continue levetiracetam 500 mg and 750 mg pm     Continue lamotrigine 50 mg twice a day     Follow up 2 weeks    Send picture of rash on my chart     I spent 25 minutes with the patient. During this time medical history data collection, counseling, and coordination of care exceeded 50% of the visit time. I addressed all questions the patient/caregiver raised in regards to the patient's medical care. This note was created with voice recognition software. Inadvertent grammatical errors, typographical errors, and \"sound a like\" substitutions may occur due to limitations of the software.  Read the note carefully and apply context when erroneous substitutions have " occurred. Thank you.     Deneen Frazier MD                   Again, thank you for allowing me to participate in the care of your patient.      Sincerely,    Deneen Frazier MD

## 2020-08-12 NOTE — NURSING NOTE
"Kindra Holt is a 74 year old female who is being evaluated via a billable video visit.      The patient has been notified of following:     \"This video visit will be conducted via a call between you and your physician/provider. We have found that certain health care needs can be provided without the need for an in-person physical exam.  This service lets us provide the care you need with a video conversation.  If a prescription is necessary we can send it directly to your pharmacy.  If lab work is needed we can place an order for that and you can then stop by our lab to have the test done at a later time.    Video visits are billed at different rates depending on your insurance coverage.  Please reach out to your insurance provider with any questions.    If during the course of the call the physician/provider feels a video visit is not appropriate, you will not be charged for this service.\"    Patient has given verbal consent for Video visit? Yes  How would you like to obtain your AVS? Mail a copy Fax to [phone]  If you are dropped from the video visit, the video invite should be resent to: Send to e-mail at: No e-mail address on record  Will anyone else be joining your video visit? No  {If patient encounters technical issues they should call [phone] :[id number]}      Video-Visit Details    Type of service:  Video Visit    Video Start Time: {video visit start/end time:152948}  Video End Time: {video visit start/end time:152948}    Originating Location (pt. Location): {video visit patient location:984211::\"Home\"}    Distant Location (provider location):  [facility] EPILEPSY CARE     Platform used for Video Visit: {Virtual Visit Platforms:587771::\"[employer]\"}    {signature options:025682}        "

## 2020-08-28 ENCOUNTER — TELEPHONE (OUTPATIENT)
Dept: NEUROLOGY | Facility: CLINIC | Age: 75
End: 2020-08-28

## 2020-08-28 DIAGNOSIS — R40.4 TRANSIENT ALTERATION OF AWARENESS: ICD-10-CM

## 2020-08-28 DIAGNOSIS — G40.919 INTRACTABLE EPILEPSY WITHOUT STATUS EPILEPTICUS, UNSPECIFIED EPILEPSY TYPE (H): ICD-10-CM

## 2020-08-28 RX ORDER — LAMOTRIGINE 25 MG/1
25 TABLET ORAL 2 TIMES DAILY
Qty: 60 TABLET | Refills: 1 | Status: SHIPPED | OUTPATIENT
Start: 2020-08-28 | End: 2020-09-16

## 2020-08-28 NOTE — TELEPHONE ENCOUNTER
"Staff also sent a [employer] message with the following information:-  \"Kindra today woke up with a swollen face, even more swollen than before when I contacted you last time. She is in pain with a fiery burn feeling to the face, and itchy. Kindra does not have a temperature, but her blood sugars are higher today, and gets that way when she has this reaction. She is very tired today and really does not want to get up. The rash is only where Kindra is exposed, and nothing under clothing. Kindra was out side for 10 minutes yesterday with 100SPF Sunscreen, A large brimmed hat, a light long sleeved shirt, and sat in the shade. She has been wearing  100 SPF and her hats even if she is only going to the mailbox at the bottom of the driveway.  CARMENZA's left eye is really swollen, and her skin is scaly and red\"    Discussed with  who reviewed the message and viewed the uploaded images.  Patient should be instructed to reduce lamotrigine by 50%, continue the same levetiracetam, and seek medical evaluation today at an urgent care / PCP     Call placed to Alexsandra, message left with call back number  "

## 2020-08-28 NOTE — TELEPHONE ENCOUNTER
Call returned by Alexsandra.    She reports the patient is slightly improved now compared to this morning in that her eye is not as swollen, and that she reports it si not as painful    I discussed the recommendation to reduce the lamotrigine dose by 50% immediately, and to seek a medical evaluation at a PCP or urgent care.  We discussed that this may or may not be related to the lamotrigine, but that it needs to be assessed.    Alxesandra expressed understanding of this plan.  I advised that they should zakiya if there are further concerns following evaluation at a medical clinic     No other questions at this time

## 2020-08-28 NOTE — TELEPHONE ENCOUNTER
Alexsandra called back. She would like a prescription for benadryl if possible to help with the rash.    Callback # [phone]

## 2020-08-28 NOTE — TELEPHONE ENCOUNTER
Patient GH would like a call back regarding the rash that patient had that is getting bad due to the her medication that  put her on . Please previous my chart message.

## 2020-09-16 ENCOUNTER — VIRTUAL VISIT (OUTPATIENT)
Dept: NEUROLOGY | Facility: CLINIC | Age: 75
End: 2020-09-16
Payer: COMMERCIAL

## 2020-09-16 ENCOUNTER — MYC MEDICAL ADVICE (OUTPATIENT)
Dept: NEUROLOGY | Facility: CLINIC | Age: 75
End: 2020-09-16

## 2020-09-16 DIAGNOSIS — G40.919 INTRACTABLE EPILEPSY WITHOUT STATUS EPILEPTICUS, UNSPECIFIED EPILEPSY TYPE (H): ICD-10-CM

## 2020-09-16 RX ORDER — DIPHENHYDRAMINE HCL 25 MG
25 CAPSULE ORAL EVERY 6 HOURS PRN
Qty: 30 CAPSULE | Refills: 1 | Status: SHIPPED | OUTPATIENT
Start: 2020-09-16

## 2020-09-16 RX ORDER — HYDROCORTISONE 2.5 %
CREAM (GRAM) TOPICAL 3 TIMES DAILY PRN
Status: SHIPPED | OUTPATIENT
Start: 2020-09-16

## 2020-09-16 RX ORDER — LEVETIRACETAM 250 MG/1
TABLET ORAL
Qty: 180 TABLET | Refills: 11 | Status: SHIPPED | OUTPATIENT
Start: 2020-09-16 | End: 2021-03-23

## 2020-09-16 SDOH — HEALTH STABILITY: MENTAL HEALTH: HOW OFTEN DO YOU HAVE A DRINK CONTAINING ALCOHOL?: NEVER

## 2020-09-16 NOTE — LETTER
Date:September 18, 2020      Patient was self referred, no letter generated. Do not send.        [facility] Health Information

## 2020-09-16 NOTE — LETTER
"2020       RE: Kindra Holt  : 1945   MRN: [id number]      Dear Colleague,    Thank you for referring your patient, Kindra Holt, to the [facility] EPILEPSY CARE at [facility]. Please see a copy of my visit note below.    Kindra Holt is a 75 year old female who is being evaluated via a billable video visit.      The patient has been notified of following:     \"This video visit will be conducted via a call between you and your physician/provider. We have found that certain health care needs can be provided without the need for an in-person physical exam.  This service lets us provide the care you need with a video conversation.  If a prescription is necessary we can send it directly to your pharmacy.  If lab work is needed we can place an order for that and you can then stop by our lab to have the test done at a later time.    Video visits are billed at different rates depending on your insurance coverage.  Please reach out to your insurance provider with any questions.    If during the course of the call the physician/provider feels a video visit is not appropriate, you will not be charged for this service.\"    Patient has given verbal consent for Video visit? Yes  How would you like to obtain your AVS? MyChart and mail a copy please   If you are dropped from the video visit, the video invite should be resent to: Send to e-mail at: [email or web address]  Will anyone else be joining your video visit? Yes: Carol may appear . How would they like to receive their invitation? Other e-mail: N/A        Video-Visit Details    Type of service:  Video Visit    Video Start Time: 1:02 PM  Video End Time: 1:26 PM    Originating Location (pt. Location): Home    Distant Location (provider location):  [facility] EPILEPSY CARE     Platform used for Video Visit: Mar Frazier MD      [facility] Epilepsy Progress Note       Patient:  Kindra Holt  :  " "1945   Age:  75 year old   Today's Office Visit: 9/16/2020    HPI: Joined with staff member (Alexsandra). On last visit she had a rash on low dose of lamotrigine and it was not clear if this was due to sun exposure or lamotrigine. we started lamotrigine. Since starting lamotrigine no dizziness or falls. She has redness on her skin mainly around her neck, sun exposed areas which is gradually worsening. She has no rash on areas covered by shirt. No oral lesion, no blood in urine, no ulcers on skin.  She is on lamotrigine 25 mg twice a day. She had a sulfa reaction to an antibiotic 6/8/2020, her skin had hives, she was hospitalized for 3 days. Her sulfa rash resolved mid June. She started lamotrigine 7/1/2020.       9/10/2020 (confused), prior to this she had a spell 6/8/2020 with UTI.     Spell Type 1: These are described as  \"odd spells, vomited several times, dud not respond, eyes were closed, increased drooling, very confused, did not follow commands, she is not able remember how to turn on faucet, speech slurred\".  No clear warning sign. Before June 2020 she had these spells daily.     On this visit we spoke about patient's seizures, antiepileptic drug, and plan of epilepsy are. Patient/caregiver was agreeable with plan of care.       Review of System: No nausea, no vomiting, + rash (itching), no significant mood changes.     Exam:   Alert, orientated, speech is fluent, face symmetric, tongue midline, extra ocular movements in tact, skin has worse redness on cheeks, neck, neckline above shirt. No ulcers or bleeding on face/neck/forearm, no lesions and mouth.     Impression:   Recurrent stereotyped spells concerning for focal impaired seizure that progress to generalized tonic-clonic convulsion   Rash with sulfa 6/8/2020 and now red skin after starting lamotrigine 7/1/2020, may be lamotrigine induced rash. Will stop lamotrigine.   History of DM and CKD  History mild cognitive impairment and mild mental retardation "   History of questionable TIA (slurred speech with no acute changes on MRI brain, these may have been seizures)    Discussion:   Discussion: 75 year old RHF with history of epilepsy, DM, and CKD on monotherapy levetiracetam for diagnosis of epilepsy.  Has recurrent episodes of slurred speech and confusion which were thought to be possible TIAs.  However MRI of the brain's were unremarkable for stroke or acute changes.  Her last spell was 9/10/2020. Last convulsion was 6/6/2020. Started lamotrigine 7/1/2020 and she developed maculopapular rash in sun exposed areas (face/neck/forearm) with itching, no bleeding or ulceration noted. We will stop lamotrigine and increase levetiracetam. Give benadryl 25 mg and hydrocortisone topical steroid cream.    Plan:   Increase levetiracetam 750 mg and 750 mg pm     Stop lamotrigine     Use benadryl 25 mg capsules and hydrocortisone as needed for rash, monitor rash.     Follow up 4 weeks    Send picture of rash if it worsens       Deneen Frazier MD                   Again, thank you for allowing me to participate in the care of your patient.      Sincerely,    Deneen Frazier MD

## 2020-09-16 NOTE — PROGRESS NOTES
"Kindra Holt is a 75 year old female who is being evaluated via a billable video visit.      The patient has been notified of following:     \"This video visit will be conducted via a call between you and your physician/provider. We have found that certain health care needs can be provided without the need for an in-person physical exam.  This service lets us provide the care you need with a video conversation.  If a prescription is necessary we can send it directly to your pharmacy.  If lab work is needed we can place an order for that and you can then stop by our lab to have the test done at a later time.    Video visits are billed at different rates depending on your insurance coverage.  Please reach out to your insurance provider with any questions.    If during the course of the call the physician/provider feels a video visit is not appropriate, you will not be charged for this service.\"    Patient has given verbal consent for Video visit? Yes  How would you like to obtain your AVS? MyChart and mail a copy please   If you are dropped from the video visit, the video invite should be resent to: Send to e-mail at: [email or web address]  Will anyone else be joining your video visit? Yes: Carol may appear . How would they like to receive their invitation? Other e-mail: N/A        Video-Visit Details    Type of service:  Video Visit    Video Start Time: 1:02 PM  Video End Time: 1:26 PM    Originating Location (pt. Location): Home    Distant Location (provider location):  [facility] EPILEPSY CARE     Platform used for Video Visit: Mar Frazier MD      [facility] Epilepsy Progress Note       Patient:  Kindra Holt  :  1945   Age:  75 year old   Today's Office Visit: 2020    HPI: Joined with staff member (Alexsandra). On last visit she had a rash on low dose of lamotrigine and it was not clear if this was due to sun exposure or lamotrigine. we started lamotrigine. Since starting lamotrigine " "no dizziness or falls. She has redness on her skin mainly around her neck, sun exposed areas which is gradually worsening. She has no rash on areas covered by shirt. No oral lesion, no blood in urine, no ulcers on skin.  She is on lamotrigine 25 mg twice a day. She had a sulfa reaction to an antibiotic 6/8/2020, her skin had hives, she was hospitalized for 3 days. Her sulfa rash resolved mid June. She started lamotrigine 7/1/2020.       9/10/2020 (confused), prior to this she had a spell 6/8/2020 with UTI.     Spell Type 1: These are described as  \"odd spells, vomited several times, dud not respond, eyes were closed, increased drooling, very confused, did not follow commands, she is not able remember how to turn on faucet, speech slurred\".  No clear warning sign. Before June 2020 she had these spells daily.     On this visit we spoke about patient's seizures, antiepileptic drug, and plan of epilepsy are. Patient/caregiver was agreeable with plan of care.       Review of System: No nausea, no vomiting, + rash (itching), no significant mood changes.     Exam:   Alert, orientated, speech is fluent, face symmetric, tongue midline, extra ocular movements in tact, skin has worse redness on cheeks, neck, neckline above shirt. No ulcers or bleeding on face/neck/forearm, no lesions and mouth.     Impression:   Recurrent stereotyped spells concerning for focal impaired seizure that progress to generalized tonic-clonic convulsion   Rash with sulfa 6/8/2020 and now red skin after starting lamotrigine 7/1/2020, may be lamotrigine induced rash. Will stop lamotrigine.   History of DM and CKD  History mild cognitive impairment and mild mental retardation   History of questionable TIA (slurred speech with no acute changes on MRI brain, these may have been seizures)    Discussion:   Discussion: 75 year old RHF with history of epilepsy, DM, and CKD on monotherapy levetiracetam for diagnosis of epilepsy.  Has recurrent episodes of " slurred speech and confusion which were thought to be possible TIAs.  However MRI of the brain's were unremarkable for stroke or acute changes.  Her last spell was 9/10/2020. Last convulsion was 6/6/2020. Started lamotrigine 7/1/2020 and she developed maculopapular rash in sun exposed areas (face/neck/forearm) with itching, no bleeding or ulceration noted. We will stop lamotrigine and increase levetiracetam. Give benadryl 25 mg and hydrocortisone topical steroid cream.    Plan:   Increase levetiracetam 750 mg and 750 mg pm     Stop lamotrigine     Use benadryl 25 mg capsules and hydrocortisone as needed for rash, monitor rash.     Follow up 4 weeks    Send picture of rash if it worsens       Deneen Frazier MD

## 2020-09-16 NOTE — PATIENT INSTRUCTIONS
Plan:   Increase levetiracetam 750 mg and 750 mg pm      Stop lamotrigine      Use benadryl 25 mg capsules and hydrocortisone as needed for rash, monitor rash.      Follow up 4 weeks     Send picture of rash if it worsens     Deneen Frazier MD

## 2020-09-22 ENCOUNTER — MEDICAL CORRESPONDENCE (OUTPATIENT)
Dept: HEALTH INFORMATION MANAGEMENT | Facility: CLINIC | Age: 75
End: 2020-09-22

## 2020-09-24 ENCOUNTER — TELEPHONE (OUTPATIENT)
Dept: NEUROLOGY | Facility: CLINIC | Age: 75
End: 2020-09-24

## 2020-09-24 NOTE — TELEPHONE ENCOUNTER
----- Message from Deneen Frazier MD sent at 9/23/2020  8:51 AM CDT -----  Regarding: LTG rash call patient  Hi. Please call patient caregiver and check on how her lamotrigine rash has improved/worsened. Thanks. Deneen

## 2020-09-24 NOTE — TELEPHONE ENCOUNTER
"Outreach call placed to the patient's caregiver, Alexsandra. Alexsandra reports that the rash has \"significantly improved\" since discontinuing lamotrigine. She asks if lamotrigine should be added to Kindra's allergy list, to which I advised to do so. Alexsandra has no questions or concerns.   "

## 2020-10-16 ENCOUNTER — VIRTUAL VISIT (OUTPATIENT)
Dept: NEUROLOGY | Facility: CLINIC | Age: 75
End: 2020-10-16
Payer: COMMERCIAL

## 2020-10-16 DIAGNOSIS — G40.919 INTRACTABLE EPILEPSY WITHOUT STATUS EPILEPTICUS, UNSPECIFIED EPILEPSY TYPE (H): Primary | ICD-10-CM

## 2020-10-16 NOTE — LETTER
Date:October 19, 2020      Patient was self referred, no letter generated. Do not send.        [facility] Information

## 2020-10-16 NOTE — PATIENT INSTRUCTIONS
Continue  levetiracetam 750 mg and 750 mg pm   Monitor mood if worse with levetiracetam   Check levetiracetam level on next blood draw  Keep seizure diary   Follow up 4 months  She had lab checked with nephrologist, they will ask to send them to us    Deneen Frazier MD

## 2020-10-16 NOTE — LETTER
"10/16/2020       RE: Kindra Holt  : 1945   MRN: [id number]      Dear Colleague,    Thank you for referring your patient, Kindra Holt, to the [facility] EPILEPSY CARE at [facility]. Please see a copy of my visit note below.    Kindra Holt is a 75 year old female who is being evaluated via a billable video visit.      The patient has been notified of following:     \"This video visit will be conducted via a call between you and your physician/provider. We have found that certain health care needs can be provided without the need for an in-person physical exam.  This service lets us provide the care you need with a video conversation.  If a prescription is necessary we can send it directly to your pharmacy.  If lab work is needed we can place an order for that and you can then stop by our lab to have the test done at a later time.    Video visits are billed at different rates depending on your insurance coverage.  Please reach out to your insurance provider with any questions.    If during the course of the call the physician/provider feels a video visit is not appropriate, you will not be charged for this service.\"    Patient has given verbal consent for Video visit? Yes  How would you like to obtain your AVS? MyChart  If you are dropped from the video visit, the video invite should be resent to: Send to e-mail at: [email or web address]  Will anyone else be joining your video visit? Yes: Carly. How would they like to receive their invitation? Send to e-mail at: [email or web address]        Video-Visit Details    Type of service:  Video Visit    Video Start Time: 12:59 PM  Video End Time: 1:19 PM    Originating Location (pt. Location): Home    Distant Location (provider location):  [facility] EPILEPSY CARE     Platform used for Video Visit: Mar Frazier MD    [facility] Epilepsy Care Progress Note    Patient:  Kindra Holt  :  1945 " "  Age:  75 year old   Today's Office Visit:  10/16/2020      HPI: Joined with staff member (debi), Alexsandra was not present. On last visit she had a rash on low dose of lamotrigine and it more concerning this was a lamotrigine rash with increased sensitivity to sun for this reason she only had a rash in sun exposed areas.  She has redness on her skin mainly around her neck, sun exposed areas which is gradually worsening. She has no rash on areas covered by shirt. No oral lesion, no blood in urine, no ulcers on skin. I advised them to stop lamotrigine and her rash did improve significant. No rash now.       Last event concerning for seizure was 10/2/2020 (no confusion with increased burping/vomiting) and last clear seizure was 9/10/2020 (confused), prior to this she had a spell 6/8/2020 with UTI. Since we increased levetiracetam she is more irritable.     Spell Type 1: These are described as  \"odd spells, vomited several times, dud not respond, eyes were closed, increased drooling, very confused, did not follow commands, she is not able remember how to turn on faucet, speech slurred\".  No clear warning sign. Before June 2020 she had these spells daily.     On this visit we spoke about patient's seizures, antiepileptic drug, and plan of epilepsy are. Patient/caregiver was agreeable with plan of care.       Review of System: No nausea, no vomiting, no rash, no significant mood changes.     Exam:   Alert, orientated, speech is fluent, face symmetric, tongue midline, extra ocular movements in tact, skin rash has resolved, there is no rash on the face/cheeks/neck.     Impression:   Recurrent stereotyped spells concerning for focal impaired seizure that progress to generalized tonic-clonic convulsion   History of lamotrigine induced sun sensitivity rash    History of DM and CKD  History mild cognitive impairment and mild mental retardation   History of questionable TIA (slurred speech with no acute changes on MRI brain, " these may have been seizures)    Discussion:   Discussion: 75 year old RHF with history of epilepsy, DM, and CKD on monotherapy levetiracetam for diagnosis of epilepsy.  Has recurrent episodes of slurred speech and confusion which were thought to be possible TIAs.  However MRI of the brain's were unremarkable for stroke or acute changes.  Her last spell was 9/10/2020. Last convulsion was 6/6/2020. Started lamotrigine 7/1/2020 and she developed lamotrigine induced maculopapular rash in sun exposed areas (face/neck/forearm) with itching, no bleeding or ulceration noted. After stopping lamotrigine her rash improved. She has some mood changes with levetiracetam, but, overall it is tolerable.     Plan:   Continue  levetiracetam 750 mg and 750 mg pm   Monitor mood if worse with levetiracetam   Check levetiracetam level on next blood draw  Keep seizure diary   Follow up 4 months  She had lab checked with nephrologist, they will ask to send them to us      Deneen Frazier MD                     Again, thank you for allowing me to participate in the care of your patient.      Sincerely,    Deneen Frazier MD

## 2020-10-16 NOTE — PROGRESS NOTES
"Kindra Holt is a 75 year old female who is being evaluated via a billable video visit.      The patient has been notified of following:     \"This video visit will be conducted via a call between you and your physician/provider. We have found that certain health care needs can be provided without the need for an in-person physical exam.  This service lets us provide the care you need with a video conversation.  If a prescription is necessary we can send it directly to your pharmacy.  If lab work is needed we can place an order for that and you can then stop by our lab to have the test done at a later time.    Video visits are billed at different rates depending on your insurance coverage.  Please reach out to your insurance provider with any questions.    If during the course of the call the physician/provider feels a video visit is not appropriate, you will not be charged for this service.\"    Patient has given verbal consent for Video visit? Yes  How would you like to obtain your AVS? MyChart  If you are dropped from the video visit, the video invite should be resent to: Send to e-mail at: [email or web address]  Will anyone else be joining your video visit? Yes: Carly. How would they like to receive their invitation? Send to e-mail at: [email or web address]        Video-Visit Details    Type of service:  Video Visit    Video Start Time: 12:59 PM  Video End Time: 1:19 PM    Originating Location (pt. Location): Home    Distant Location (provider location):  [facility] EPILEPSY CARE     Platform used for Video Visit: Mar Frazier MD    [facility] Epilepsy Care Progress Note    Patient:  Kindra Holt  :  1945   Age:  75 year old   Today's Office Visit:  10/16/2020      HPI: Joined with staff member (carly), Alexsandra was not present. On last visit she had a rash on low dose of lamotrigine and it more concerning this was a lamotrigine rash with increased sensitivity to sun for this " "reason she only had a rash in sun exposed areas.  She has redness on her skin mainly around her neck, sun exposed areas which is gradually worsening. She has no rash on areas covered by shirt. No oral lesion, no blood in urine, no ulcers on skin. I advised them to stop lamotrigine and her rash did improve significant. No rash now.       Last event concerning for seizure was 10/2/2020 (no confusion with increased burping/vomiting) and last clear seizure was 9/10/2020 (confused), prior to this she had a spell 6/8/2020 with UTI. Since we increased levetiracetam she is more irritable.     Spell Type 1: These are described as  \"odd spells, vomited several times, dud not respond, eyes were closed, increased drooling, very confused, did not follow commands, she is not able remember how to turn on faucet, speech slurred\".  No clear warning sign. Before June 2020 she had these spells daily.     On this visit we spoke about patient's seizures, antiepileptic drug, and plan of epilepsy are. Patient/caregiver was agreeable with plan of care.       Review of System: No nausea, no vomiting, no rash, no significant mood changes.     Exam:   Alert, orientated, speech is fluent, face symmetric, tongue midline, extra ocular movements in tact, skin rash has resolved, there is no rash on the face/cheeks/neck.     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  Has recurrent episodes of slurred speech and confusion which were thought to be possible TIAs.  However MRI " of the brain's were unremarkable for stroke or acute changes.  Her last spell was 9/10/2020. Last convulsion was 6/6/2020. Started lamotrigine 7/1/2020 and she developed lamotrigine induced maculopapular rash in sun exposed areas (face/neck/forearm) with itching, no bleeding or ulceration noted. After stopping lamotrigine her rash improved. She has some mood changes with levetiracetam, but, overall it is tolerable.     Plan:   Continue  levetiracetam 750 mg and 750 mg pm   Monitor mood if worse with levetiracetam   Check levetiracetam level on next blood draw  Keep seizure diary   Follow up 4 months  She had lab checked with nephrologist, they will ask to send them to us      Deneen Frazier MD

## 2020-12-21 ENCOUNTER — VIRTUAL VISIT (OUTPATIENT)
Dept: NEUROLOGY | Facility: CLINIC | Age: 75
End: 2020-12-21
Payer: COMMERCIAL

## 2020-12-21 DIAGNOSIS — G40.919 INTRACTABLE EPILEPSY WITHOUT STATUS EPILEPTICUS, UNSPECIFIED EPILEPSY TYPE (H): Primary | ICD-10-CM

## 2020-12-21 NOTE — LETTER
"2020       RE: Kindra Holt  : 1945   MRN: [id number]      Dear Colleague,    Thank you for referring your patient, Kindra Holt, to the [facility]PILE[facility] at [facility]. Please see a copy of my visit note below.    Kindra Holt is a 75 year old female who is being evaluated via a billable telephone visit.      The patient has been notified of following:     \"This telephone visit will be conducted via a call between you and your physician/provider. We have found that certain health care needs can be provided without the need for a physical exam.  This service lets us provide the care you need with a short phone conversation.  If a prescription is necessary we can send it directly to your pharmacy.  If lab work is needed we can place an order for that and you can then stop by our lab to have the test done at a later time.    Telephone visits are billed at different rates depending on your insurance coverage. During this emergency period, for some insurers they may be billed the same as an in-person visit.  Please reach out to your insurance provider with any questions.    If during the course of the call the physician/provider feels a telephone visit is not appropriate, you will not be charged for this service.\"    Patient has given verbal consent for Telephone visit?  Yes    What phone number would you like to be contacted at? cell    How would you like to obtain your AVS? MyChart    Phone call duration: 20 minutes    Deneen Frazier MD      [facility] Progress Note    Patient:  Kinrda Holt  :  1945   Age:  75 year old   Today's Visit:  2020    HPI: Joined with staff member Alexsandra, (debi was not present). On last visit we stopped lamotrigine and her rash improved. Her skin was red today, but, she has focused on moisturizer.     Last event concerning for seizure was 10/2/2020 (no confusion with increased burping/vomiting) " "and last clear seizure was 9/10/2020 (confused), prior to this she had a spell 6/8/2020 with UTI. Since we increased levetiracetam she is more irritable. Currently, her skin is red and its not clear why.     Spell Type 1: These are described as  \"odd spells, vomited several times, dud not respond, eyes were closed, increased drooling, very confused, did not follow commands, she is not able remember how to turn on faucet, speech slurred\".  No clear warning sign. Before June 2020 she had these spells daily.     Caregiver denies dizziness, no double vision, no nausea, no vomiting, no abdominal pain, no mood changes, no ER visits, no hospitalizations.  She has been a little irritable and defiant. On this visit we spoke about patient's seizures, antiepileptic drug, and plan of epilepsy are. Patient/caregiver was agreeable with plan of care.       Review of System: No nausea, no vomiting, no rash, no significant mood changes.       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  Has recurrent episodes of slurred speech and confusion which were thought to be possible TIAs.  However MRI of the brain's were unremarkable for stroke or acute changes.  Her last spell was 9/10/2020. Last convulsion was 6/6/2020. Started lamotrigine 7/1/2020 and she developed lamotrigine induced maculopapular rash in sun exposed areas (face/neck/forearm) with itching, no bleeding or ulceration noted. After stopping lamotrigine her rash improved, but, today her skin is red. We " will let her rash completely resolve and then consider starting oxcarbazepine.  She has some mood changes with levetiracetam, but, overall it is tolerable.     Plan:   Continue  levetiracetam 750 mg and 750 mg pm   Check levetiracetam level, AST, ALT, and Na+ on next blood draw  Keep seizure diary   Follow up 4 months    Deneen Frazier MD                       Again, thank you for allowing me to participate in the care of your patient.      Sincerely,    Deneen Frazier MD

## 2020-12-21 NOTE — LETTER
Date:December 22, 2020      Patient was self referred, no letter generated. Do not send.        [facility] Health Information

## 2020-12-21 NOTE — PROGRESS NOTES
"Kindra Holt is a 75 year old female who is being evaluated via a billable telephone visit.      The patient has been notified of following:     \"This telephone visit will be conducted via a call between you and your physician/provider. We have found that certain health care needs can be provided without the need for a physical exam.  This service lets us provide the care you need with a short phone conversation.  If a prescription is necessary we can send it directly to your pharmacy.  If lab work is needed we can place an order for that and you can then stop by our lab to have the test done at a later time.    Telephone visits are billed at different rates depending on your insurance coverage. During this emergency period, for some insurers they may be billed the same as an in-person visit.  Please reach out to your insurance provider with any questions.    If during the course of the call the physician/provider feels a telephone visit is not appropriate, you will not be charged for this service.\"    Patient has given verbal consent for Telephone visit?  Yes    What phone number would you like to be contacted at? cell    How would you like to obtain your AVS? MyChart    Phone call duration: 20 minutes    Deneen Frazier MD      [facility] Epilepsy Care Progress Note    Patient:  Kindra Holt  :  1945   Age:  75 year old   Today's Visit:  2020    HPI: Joined with staff member Alexsandra, (debi was not present). On last visit we stopped lamotrigine and her rash improved. Her skin was red today, but, she has focused on moisturizer.     Last event concerning for seizure was 10/2/2020 (no confusion with increased burping/vomiting) and last clear seizure was 9/10/2020 (confused), prior to this she had a spell 2020 with UTI. Since we increased levetiracetam she is more irritable. Currently, her skin is red and its not clear why.     Spell Type 1: These are described as  \"odd spells, vomited several " "times, dud not respond, eyes were closed, increased drooling, very confused, did not follow commands, she is not able remember how to turn on faucet, speech slurred\".  No clear warning sign. Before June 2020 she had these spells daily.     Caregiver denies dizziness, no double vision, no nausea, no vomiting, no abdominal pain, no mood changes, no ER visits, no hospitalizations.  She has been a little irritable and defiant. On this visit we spoke about patient's seizures, antiepileptic drug, and plan of epilepsy are. Patient/caregiver was agreeable with plan of care.       Review of System: No nausea, no vomiting, no rash, no significant mood changes.       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  Has recurrent episodes of slurred speech and confusion which were thought to be possible TIAs.  However MRI of the brain's were unremarkable for stroke or acute changes.  Her last spell was 9/10/2020. Last convulsion was 6/6/2020. Started lamotrigine 7/1/2020 and she developed lamotrigine induced maculopapular rash in sun exposed areas (face/neck/forearm) with itching, no bleeding or ulceration noted. After stopping lamotrigine her rash improved, but, today her skin is red. We will let her rash completely resolve and then consider starting oxcarbazepine.  She has some mood changes with levetiracetam, but, overall it is tolerable.     Plan:   Continue  levetiracetam 750 mg and 750 mg pm   Check levetiracetam level, AST, ALT, and Na+ on next blood " draw  Keep seizure diary   Follow up 4 months    Deneen Frazier MD

## 2020-12-21 NOTE — PATIENT INSTRUCTIONS
Continue  levetiracetam 750 mg and 750 mg pm and consider oxcarbazepine next visit   Monitor mood if worse with levetiracetam   Check levetiracetam level on next blood draw  Keep seizure diary   Follow up 4 months  She had lab checked with nephrologist, they will ask to send them to us  Deneen Frazier MD       Patient Education    Oxcarbazepine Oral suspension    Oxcarbazepine Oral tablet    Oxcarbazepine Oral tablet, extended-release  Oxcarbazepine Oral tablet, extended-release  What is this medicine?  OXCARBAZEPINE (ox car BAZ e peen) is used to treat people with epilepsy. It helps prevent partial seizures.  This medicine may be used for other purposes; ask your health care provider or pharmacist if you have questions.  What should I tell my health care provider before I take this medicine?  They need to know if you have any of these conditions:     ancestry    kidney disease    liver disease    suicidal thoughts, plans, or attempt; a previous suicide attempt by you or a family member    any unusual or allergic reaction to oxcarbazepine, carbamazepine, other medicines, foods, dyes, or preservatives    pregnant or trying to get pregnant    breast-feeding  How should I use this medicine?  Take this medicine by mouth with a glass of water. Follow the directions on the prescription label. Take this medicine on an empty stomach, at least 1 hour before or 2 hours after food. Do not take with food. Do not cut, crush, or chew this medicine. Take your doses at regular intervals. Do not take your medicine more often than directed. Do not stop taking except on your doctor's advice.  A special MedGuide will be given to you by the pharmacist with each prescription and refill. Be sure to read this information carefully each time.  Talk to your pediatrician regarding the use of this medicine in children. While this medicine may be prescribed for children as young as 6 years for selected conditions, precautions do  apply.  Overdosage: If you think you've taken too much of this medicine contact a poison control center or emergency room at once.  NOTE: This medicine is only for you. Do not share this medicine with others.  What if I miss a dose?  If you miss a dose, take it as soon as you can. If it is almost time for your next dose, take only that dose. Do not take double or extra doses.  What may interact with this medicine?  Do not take this medicine with any of the following medications:    carbamazepine  This medicine may also interact with the following medications:    birth control pills    certain medicines for seizures like phenobarbital, phenytoin, valproic acid    certain medicines for high blood pressure like felodipine, diltiazem, verapamil    cyclosporine  This list may not describe all possible interactions. Give your health care provider a list of all the medicines, herbs, non-prescription drugs, or dietary supplements you use. Also tell them if you smoke, drink alcohol, or use illegal drugs. Some items may interact with your medicine.  What should I watch for while using this medicine?  Tell your doctor or healthcare professional if your symptoms do not start to get better or if they get worse. Visit your doctor or health care professional for regular checks on your progress. Do not stop taking except on your doctor's advice. This increases the risk of seizures. Your doctor will tell you how much medicine to take. Wear a medical ID bracelet or chain, and carry a card that describes your disease and details of your medicine and dosage times.  Rarely, serious skin allergic reactions may occur with this medicine. If you develop a skin rash, redness, itching, peeling skin inside your mouth, swollen glands, or a fever while taking this medicine, contact your health care provider immediately.  You may get drowsy or dizzy. Do not drive, use machinery, or do anything that needs mental alertness until you know how this  medicine affects you. Do not stand or sit up quickly, especially if you are an older patient. This reduces the risk of dizzy or fainting spells. Alcohol may interfere with the effects of this medicine. Avoid alcoholic drinks.  Birth control pills may not work properly while you are taking this medicine. Talk to your doctor about using an extra method of birth control.  The use of this medicine may increase the chance of suicidal thoughts or actions. Pay special attention to how you are responding while on this medicine. Any worsening of mood, or thoughts of suicide or dying should be reported to your health care professional right away.  Women who become pregnant while using this medicine may enroll in the [place] Antiepileptic Drug Pregnancy Registry by calling 1-451.798.7801. This registry collects information about the safety of antiepileptic drug use during pregnancy.  What side effects may I notice from receiving this medicine?  Side effects that you should report to your doctor or health care professional as soon as possible:    allergic reactions like skin rash, itching or hives, swelling of the face, lips, or tongue    changes in vision    confusion    fever    infection    problems with balance, speaking, walking    redness, blistering, peeling or loosening of the skin, including inside the mouth    swelling of ankles, feet, hands    suicidal thoughts or other mood changes    trouble passing urine or change in the amount of urine    unusual bleeding or bruising    unusually weak or tired    yellowing of eyes or skin  Side effects that usually do not require medical attention (Report these to your doctor or health care professional if they continue or are bothersome.):    constipation    diarrhea    headache    loss of appetite    nausea, vomiting    nervous    tremors    trouble sleeping  This list may not describe all possible side effects. Call your doctor for medical advice about side effects. You  may report side effects to FDA at 4-418-FDA-3659.  Where should I keep my medicine?  Keep out of the reach of children.  Store at room temperature between 15 and 30 degrees C (59 and 86 degrees F). Keep container tightly closed. Throw away any unused medicine after the expiration date.  NOTE: This sheet is a summary. It may not cover all possible information. If you have questions about this medicine, talk to your doctor, pharmacist, or health care provider.  NOTE:This sheet is a summary. It may not cover all possible information. If you have questions about this medicine, talk to your doctor, pharmacist, or health care provider. Copyright  2016 Gold Standard

## 2021-01-04 ENCOUNTER — HEALTH MAINTENANCE LETTER (OUTPATIENT)
Age: 76
End: 2021-01-04

## 2021-02-04 ENCOUNTER — APPOINTMENT (OUTPATIENT)
Dept: URBAN - METROPOLITAN AREA CLINIC 257 | Age: 76
Setting detail: DERMATOLOGY
End: 2021-02-04

## 2021-02-04 DIAGNOSIS — L81.4 OTHER MELANIN HYPERPIGMENTATION: ICD-10-CM

## 2021-02-04 DIAGNOSIS — Z71.89 OTHER SPECIFIED COUNSELING: ICD-10-CM

## 2021-02-04 DIAGNOSIS — L82.1 OTHER SEBORRHEIC KERATOSIS: ICD-10-CM

## 2021-02-04 DIAGNOSIS — D22 MELANOCYTIC NEVI: ICD-10-CM

## 2021-02-04 DIAGNOSIS — L56.5 DISSEMINATED SUPERFICIAL ACTINIC POROKERATOSIS (DSAP): ICD-10-CM

## 2021-02-04 DIAGNOSIS — D18.0 HEMANGIOMA: ICD-10-CM

## 2021-02-04 DIAGNOSIS — L57.8 OTHER SKIN CHANGES DUE TO CHRONIC EXPOSURE TO NONIONIZING RADIATION: ICD-10-CM

## 2021-02-04 DIAGNOSIS — L81.8 OTHER SPECIFIED DISORDERS OF PIGMENTATION: ICD-10-CM

## 2021-02-04 PROBLEM — D22.5 MELANOCYTIC NEVI OF TRUNK: Status: ACTIVE | Noted: 2021-02-04

## 2021-02-04 PROBLEM — D18.01 HEMANGIOMA OF SKIN AND SUBCUTANEOUS TISSUE: Status: ACTIVE | Noted: 2021-02-04

## 2021-02-04 PROCEDURE — OTHER COUNSELING: OTHER

## 2021-02-04 PROCEDURE — 99214 OFFICE O/P EST MOD 30 MIN: CPT

## 2021-02-04 PROCEDURE — OTHER MIPS QUALITY: OTHER

## 2021-02-04 PROCEDURE — OTHER PRESCRIPTION: OTHER

## 2021-02-04 RX ORDER — KETOCONAZOLE 20 MG/G
CREAM TOPICAL
Qty: 2 | Refills: 4 | Status: ERX | COMMUNITY
Start: 2021-02-04

## 2021-02-04 RX ORDER — HYDROCORTISONE 25 MG/G
CREAM TOPICAL
Qty: 1 | Refills: 4 | Status: ERX | COMMUNITY
Start: 2021-02-04

## 2021-02-04 ASSESSMENT — LOCATION ZONE DERM
LOCATION ZONE: LEG
LOCATION ZONE: TRUNK

## 2021-02-04 ASSESSMENT — LOCATION SIMPLE DESCRIPTION DERM
LOCATION SIMPLE: LEFT UPPER BACK
LOCATION SIMPLE: UPPER BACK
LOCATION SIMPLE: LEFT THIGH
LOCATION SIMPLE: CHEST

## 2021-02-04 ASSESSMENT — LOCATION DETAILED DESCRIPTION DERM
LOCATION DETAILED: LEFT MID-UPPER BACK
LOCATION DETAILED: LEFT ANTERIOR DISTAL THIGH
LOCATION DETAILED: STERNAL NOTCH
LOCATION DETAILED: INFERIOR THORACIC SPINE

## 2021-02-04 NOTE — PROCEDURE: MIPS QUALITY
Quality 110: Preventive Care And Screening: Influenza Immunization: Influenza Immunization Ordered or Recommended, but not Administered due to system reason
Quality 431: Preventive Care And Screening: Unhealthy Alcohol Use - Screening: Patient screened for unhealthy alcohol use using a single question and scores less than 2 times per year
Quality 226: Preventive Care And Screening: Tobacco Use: Screening And Cessation Intervention: Patient screened for tobacco use and is an ex/non-smoker
Quality 130: Documentation Of Current Medications In The Medical Record: Current Medications Documented
Quality 111:Pneumonia Vaccination Status For Older Adults: Pneumococcal Vaccination not Administered or Previously Received, Reason not Otherwise Specified
Detail Level: Detailed

## 2021-03-23 ENCOUNTER — VIRTUAL VISIT (OUTPATIENT)
Dept: NEUROLOGY | Facility: CLINIC | Age: 76
End: 2021-03-23
Payer: COMMERCIAL

## 2021-03-23 DIAGNOSIS — G40.919 INTRACTABLE EPILEPSY WITHOUT STATUS EPILEPTICUS, UNSPECIFIED EPILEPSY TYPE (H): Primary | ICD-10-CM

## 2021-03-23 RX ORDER — LEVETIRACETAM 250 MG/1
TABLET ORAL
Qty: 180 TABLET | Refills: 11 | Status: SHIPPED | OUTPATIENT
Start: 2021-03-23 | End: 2021-08-24

## 2021-03-23 RX ORDER — KETOCONAZOLE 20 MG/G
CREAM TOPICAL DAILY PRN
COMMUNITY
Start: 2021-02-15

## 2021-03-23 NOTE — PROGRESS NOTES
"Kindra is a 75 year old who is being evaluated via a billable video visit.      How would you like to obtain your AVS? MyChart  If the video visit is dropped, the invitation should be resent by: Send to e-mail at: [email or web address]   Will anyone else be joining your video visit? Yes: Rosaline joining on video today . How would they like to receive their invitation? Send to e-mail at: [email or web address]      Video Start Time: 11:26 AM  Video-Visit Details    Type of service:  Video Visit    Video End Time:11:47 AM    Originating Location (pt. Location): Home    Distant Location (provider location):  [facility]     Platform used for Video Visit: Mar Holt is a 75 year old female who is being evaluated via a billable telephone visit.        [facility]    Patient:  Kindra Holt  :  1945   Age:  75 year old   Today's Visit:  3/23/2021    HPI: Joined with staff member Alexsandra, (debi was not present). In  we stopped lamotrigine and her rash improved. Her skin was NOT red today. She has intermittent flare up and topical medications helps. She has skin sensitivity and redness rarely. There are concerns sun may worsen sensitivity.      Last event concerning for seizure was 10/2/2020 (no confusion with increased burping/vomiting) and last clear seizure was 9/10/2020 (confused), prior to this she had a spell 2020 with UTI. Since we increased levetiracetam she is more irritable. Currently, her skin is red and its not clear why.     Spell Type 1: These are described as  \"odd spells, vomited several times, dud not respond, eyes were closed, increased drooling, very confused, did not follow commands, she is not able remember how to turn on faucet, speech slurred\".  No clear warning sign. Before 2020 she had these spells daily.     Caregiver denies dizziness, no double vision, no nausea, no vomiting, no abdominal pain, no mood " changes, no ER visits, no hospitalizations.  She has been a little irritable and defiant. On this visit we spoke about patient's seizures, antiepileptic drug, and plan of epilepsy are. Patient/caregiver was agreeable with plan of care.     Current antiepileptic drug:   Lamotrigine stopped   Levetiracetam 750 mg twice a day     Review of System: No nausea, no vomiting, no rash, no significant mood changes.       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  Has recurrent episodes of slurred speech and confusion which were thought to be possible TIAs.  However MRI of the brain's were unremarkable for stroke or acute changes.  Her last spell was 9/10/2020. Last convulsion was 6/6/2020. Started lamotrigine 7/1/2020 and she developed lamotrigine induced maculopapular rash in sun exposed areas (face/neck/forearm) with itching, no bleeding or ulceration noted. After stopping lamotrigine her rash improved, but, she continues to have small rashes with sun exposure. I asked florencio to check with dermatologist if she had inflamatory/autoimmune workup.  She has some mood changes with levetiracetam, but, overall it is tolerable.     Plan:   Continue  levetiracetam 750 mg and 750 mg pm     Check levetiracetam level near home    Follow up 4 months    RN to complete seizure action protocol. Last seizure was 2020. No rescue med needed.     Ask dermatologist if oxcarbazepine would cause rash (there is cross sensitivity with both oxcarbazepine and lamotrigine. They  "have somewhat similar organic structures which may results in higher risk of rash). If needed, we may consider this in the future.  Also, please review with dermatologist that levetiracetam is less likely to cause a rash. Could there be other underlying inflammatory conditions? Thanks.       I spent 23 minutes for patient's medical care. During this time key medical decisions were made with review of medical chart prior to visit, visit with patient, counseling/education, and post visit work, including documentation on the day of visit. I addressed all questions the patient/caregiver raised in regards to the patient's medical care. This note was created with voice recognition software. Inadvertent grammatical errors, typographical errors, and \"sound a like\" substitutions may occur due to limitations of the software.  Read the note carefully and apply context when erroneous substitutions have occurred. Thank you.     Deneen Frazier MD                     "

## 2021-03-23 NOTE — LETTER
RE: Kindra Holt  [address] [place] [id number]      SEIZURE PLAN AND PROTOCOL    Name: Kindra Holt  :  1945  Date:  2021    Type of Seizure(s):    1. focal seizure with impairment in awareness    Plan of Care:    1.  Follow general seizure care and First Aid guidelines for seizures.  2.  Anticonvulsant medications will be administered as prescribed.  3.  All seizures will be documented on a Seizure Report including:  date, time, length of seizure, specific body movements and behaviors exhibited during the seizure, and post-seizure state.  4. Antiepileptic blood levels will be ordered by the physician based upon client's condition and medications.    Missed Doses:    IF YOU REALIZE WITHIN 24 HOURS:    ...You MISSED ONE DOSE of your anticonvulsant medication(s), take the missed dose immediately unless it is time for your next scheduled dose. If that is the case, take your next scheduled dose, wait two hours, and then take the missed dose.    ...You MISSED TWO OR MORE DOSES, take one of the missed doses immediately, even if it is time for your next scheduled dose. Then call the [facility] clinic to schedule an appointment.     IF YOU REALIZE NOW YOU MISSED A DOSE MORE THAN 24 HOURS AGO, ayden it on your calendar. DO NOT take an extra dose.    Medication Errors:    Your facility nurse should be notified of any medication errors. The nurse should observe the urgency of the error. It is not necessary to notify the MD on call unless the facility nurse or delegate believes it to be life threatening or could possibly result in a serious complication. Routine notices required by regulation should be telephoned to the clinic during office hours.    Extra Dose:    An extra dose of an antiepileptic drug is not serious. Ordinarily, at most, the client may experience increased side effects for several hours. Contact your facility nurse immediately if an extra dose was given.    Seizure  Protocol:    Provide first aid for seizure.     When to call 911 for Seizures:    Call 911 if:    The person does not start breathing within 1 minute after the seizure. If this happens call 911 immediately and start CPR.    The person sustains an injury    The person has one seizure right after another without regaining consciousness in between    A GTC seizure lasts over 5 minutes    The person requests an ambulance    Provider:  Deneen Frazier M.D.

## 2021-03-23 NOTE — LETTER
RE: Kindra Holt  [address] [place] [id number]      SEIZURE PLAN AND PROTOCOL    Name: Kindra Holt  :  1945  Date:  2021    Type of Seizure(s):    1. focal seizure with impairment in awareness    Plan of Care:    1.  Follow general seizure care and First Aid guidelines for seizures.  2.  Anticonvulsant medications will be administered as prescribed.  3.  All seizures will be documented on a Seizure Report including:  date, time, length of seizure, specific body movements and behaviors exhibited during the seizure, and post-seizure state.  4. Antiepileptic blood levels will be ordered by the physician based upon client's condition and medications.    Missed Doses:    IF YOU REALIZE WITHIN 24 HOURS:    ...You MISSED ONE DOSE of your anticonvulsant medication(s), take the missed dose immediately unless it is time for your next scheduled dose. If that is the case, take your next scheduled dose, wait two hours, and then take the missed dose.    ...You MISSED TWO OR MORE DOSES, take one of the missed doses immediately, even if it is time for your next scheduled dose. Then call the [facility] clinic to schedule an appointment.     IF YOU REALIZE NOW YOU MISSED A DOSE MORE THAN 24 HOURS AGO, ayden it on your calendar. DO NOT take an extra dose.    Medication Errors:    Your facility nurse should be notified of any medication errors. The nurse should observe the urgency of the error. It is not necessary to notify the MD on call unless the facility nurse or delegate believes it to be life threatening or could possibly result in a serious complication. Routine notices required by regulation should be telephoned to the clinic during office hours.    Extra Dose:    An extra dose of an antiepileptic drug is not serious. Ordinarily, at most, the client may experience increased side effects for several hours. Contact your facility nurse immediately if an extra dose was given.    Seizure  Protocol:    Provide first aid for seizure.     When to call 911 for Seizures:    Call 911 if:    The person does not start breathing within 1 minute after the seizure. If this happens call 911 immediately and start CPR.    The person sustains an injury    The person has one seizure right after another without regaining consciousness in between    A GTC seizure lasts over 5 minutes    The person requests an ambulance    Provider:  Deneen Frazier M.D.

## 2021-03-23 NOTE — LETTER
"3/23/2021       RE: Kindra Holt  : 1945   MRN: [id number]      Dear Colleague,    Thank you for referring your patient, Kindra Holt, to the [facility] EPILEPSY CARE at [facility]. Please see a copy of my visit note below.    Kindra is a 75 year old who is being evaluated via a billable video visit.      How would you like to obtain your AVS? MyChart  If the video visit is dropped, the invitation should be resent by: Send to e-mail at: [email or web address]   Will anyone else be joining your video visit? Yes: Alexsandra and Kindra joining on video today . How would they like to receive their invitation? Send to e-mail at: [email or web address]      Video Start Time: 11:26 AM  Video-Visit Details    Type of service:  Video Visit    Video End Time:11:47 AM    Originating Location (pt. Location): Home    Distant Location (provider location):  [facility] EPILEPSY CARE     Platform used for Video Visit: [facility]     Kindra Holt is a 75 year old female who is being evaluated via a billable telephone visit.        [facility] Epilepsy Care Progress Note    Patient:  Kindra Holt  :  1945   Age:  75 year old   Today's Visit:  3/23/2021    HPI: Joined with staff member Alexsandra, (debi was not present). In  we stopped lamotrigine and her rash improved. Her skin was NOT red today. She has intermittent flare up and topical medications helps. She has skin sensitivity and redness rarely. There are concerns sun may worsen sensitivity.      Last event concerning for seizure was 10/2/2020 (no confusion with increased burping/vomiting) and last clear seizure was 9/10/2020 (confused), prior to this she had a spell 2020 with UTI. Since we increased levetiracetam she is more irritable. Currently, her skin is red and its not clear why.     Spell Type 1: These are described as  \"odd spells, vomited several times, dud not respond, eyes were closed, increased " "drooling, very confused, did not follow commands, she is not able remember how to turn on faucet, speech slurred\".  No clear warning sign. Before June 2020 she had these spells daily.     Caregiver denies dizziness, no double vision, no nausea, no vomiting, no abdominal pain, no mood changes, no ER visits, no hospitalizations.  She has been a little irritable and defiant. On this visit we spoke about patient's seizures, antiepileptic drug, and plan of epilepsy are. Patient/caregiver was agreeable with plan of care.     Current antiepileptic drug:   Lamotrigine stopped   Levetiracetam 750 mg twice a day     Review of System: No nausea, no vomiting, no rash, no significant mood changes.       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  Has recurrent episodes of slurred speech and confusion which were thought to be possible TIAs.  However MRI of the brain's were unremarkable for stroke or acute changes.  Her last spell was 9/10/2020. Last convulsion was 6/6/2020. Started lamotrigine 7/1/2020 and she developed lamotrigine induced maculopapular rash in sun exposed areas (face/neck/forearm) with itching, no bleeding or ulceration noted. After stopping lamotrigine her rash improved, but, she continues to have small rashes with sun exposure. I asked florencio to check with dermatologist if she had inflamatory/autoimmune workup.  She has some mood changes with levetiracetam, but, overall it is tolerable.     Plan:   Continue  levetiracetam 750 mg and 750 mg " "pm     Check levetiracetam level near home    Follow up 4 months    RN to complete seizure action protocol. Last seizure was 2020. No rescue med needed.     Ask dermatologist if oxcarbazepine would cause rash (there is cross sensitivity with both oxcarbazepine and lamotrigine. They have somewhat similar organic structures which may results in higher risk of rash). If needed, we may consider this in the future.  Also, please review with dermatologist that levetiracetam is less likely to cause a rash. Could there be other underlying inflammatory conditions? Thanks.       I spent 23 minutes for patient's medical care. During this time key medical decisions were made with review of medical chart prior to visit, visit with patient, counseling/education, and post visit work, including documentation on the day of visit. I addressed all questions the patient/caregiver raised in regards to the patient's medical care. This note was created with voice recognition software. Inadvertent grammatical errors, typographical errors, and \"sound a like\" substitutions may occur due to limitations of the software.  Read the note carefully and apply context when erroneous substitutions have occurred. Thank you.     Deneen Frazier MD                         Again, thank you for allowing me to participate in the care of your patient.      Sincerely,    Deneen Frazier MD      "

## 2021-03-23 NOTE — Clinical Note
Please send seizure action protocol. Caregivers send us a copy on [employer]. Last seizure was 2020, focal seizure with impairment in awareness . Thanks. Deneen

## 2021-03-23 NOTE — PATIENT INSTRUCTIONS
Ask dermatologist if oxcarbazepine would cause rash (there is cross sensitivity with both oxcarbazepine and lamotrigine. They have somewhat similar organic structures which may results in higher risk of rash). If needed, we may consider this in the future.  Also, please review with dermatologist that levetiracetam is less likely to cause a rash. Could there be other underlying inflammatory autoimmune conditions? Thanks.     For now continue: levetiracetam 750 mg twice a day     Follow up  8 months     Deneen Frazier MD

## 2021-04-07 ENCOUNTER — TRANSFERRED RECORDS (OUTPATIENT)
Dept: HEALTH INFORMATION MANAGEMENT | Facility: CLINIC | Age: 76
End: 2021-04-07

## 2021-04-14 ENCOUNTER — TELEPHONE (OUTPATIENT)
Dept: NEUROLOGY | Facility: CLINIC | Age: 76
End: 2021-04-14

## 2021-04-15 LAB — KEPPRA (LEVETIRACETAM) LEVEL: 25.1 UG/ML (ref 10–40)

## 2021-04-16 DIAGNOSIS — G40.919 INTRACTABLE EPILEPSY WITHOUT STATUS EPILEPTICUS, UNSPECIFIED EPILEPSY TYPE (H): ICD-10-CM

## 2021-07-27 ENCOUNTER — VIRTUAL VISIT (OUTPATIENT)
Dept: NEUROLOGY | Facility: CLINIC | Age: 76
End: 2021-07-27
Payer: COMMERCIAL

## 2021-07-27 DIAGNOSIS — G40.919 INTRACTABLE EPILEPSY WITHOUT STATUS EPILEPTICUS, UNSPECIFIED EPILEPSY TYPE (H): Primary | ICD-10-CM

## 2021-07-27 NOTE — PROGRESS NOTES
"Kindra is a 75 year old who is being evaluated via a billable video visit.      How would you like to obtain your AVS? [employer]  If the video visit is dropped, the invitation should be resent by: Send to e-mail at: [email or web address]  Will anyone else be joining your video visit? Yes: Carly. How would they like to receive their invitation? Other e-mail: [email or web address] will be with patient       Video Start Time: 11:21 AM  Video-Visit Details    Type of service:  Video Visit    Video End Time:11:34 AM    Originating Location (pt. Location): Home    Distant Location (provider location):  [facility] EPILEPSY CARE     Platform used for Video Visit: [employer]   If any questions call Carly at : [phone]      [facility]    Patient:  Kindra Holt  :  1945   Age:  75 year old   Today's Visit:  2021    HPI: Joined with staff member carly. In  we stopped lamotrigine and her rash improved. Her skin was NOT red today. She has intermittent flare up and topical medications helps. She has skin sensitivity and redness rarely. There are concerns sun may worsen sensitivity. They saw dermatologist and reviewed starting oxcarbazepine.      She has staring spells twice a week. Spells last 1 minutes, \"she looks like she is looking pass me\",     Last event concerning for seizure was 10/2/2020 (no confusion with increased burping/vomiting) and last clear seizure was 9/10/2020 (confused), prior to this she had a spell 2020 with UTI. Since we increased levetiracetam she is more irritable. Currently, her skin is red and its not clear why.     Spell Type 1: These are described as  \"odd spells, vomited several times, dud not respond, eyes were closed, increased drooling, very confused, did not follow commands, she is not able remember how to turn on faucet, speech slurred\".  No clear warning sign. Before 2020 she had these spells daily.     Caregiver denies dizziness, no double vision, " no nausea, no vomiting, no abdominal pain, no mood changes, no ER visits, no hospitalizations.  She has been a little irritable and defiant. On this visit we spoke about patient's seizures, antiepileptic drug, and plan of epilepsy are. Patient/caregiver was agreeable with plan of care.     Current antiepileptic drug:   Levetiracetam 750 mg twice a day     Impression:   Recurrent stereotyped spells concerning for focal impaired seizure that progress to generalized tonic-clonic convulsion   Lamotrigine induced sun sensitivity rash rarely    History of DM and CKD  History mild cognitive impairment and mild mental retardation   History of questionable TIA (slurred speech with no acute changes on MRI brain, these may have been seizures)    Discussion:   Discussion: 75 year old RHF with history of epilepsy, DM, and CKD on monotherapy levetiracetam for diagnosis of epilepsy.  Has recurrent episodes of slurred speech and confusion which were thought to be possible TIAs.  However MRI of the brain's were unremarkable for stroke or acute changes.  Her last spell was 9/10/2020. Last convulsion was 6/6/2020. Started lamotrigine 7/1/2020 and she developed lamotrigine induced maculopapular rash in sun exposed areas (face/neck/forearm) with itching, no bleeding or ulceration noted. After stopping lamotrigine her rash resolved.      She has staring spells twice a week.  Its not clear to me if these are epileptic spells or her dazing off during the day.  It would be helpful to complete an ambulatory 48-hour study to evaluate for seizure activity.  I am reluctant to add another seizure medication due to side effect sensitivity and fatigue.  If we are to use the medication I recommend we try oxcarbazepine.  If she has a rash to oxcarbazepine we can try to transition her to Vimpat.  I am not increasing levetiracetam because she has mood changes with levetiracetam.  She is extremely kind, however with levetiracetam she gets very  "irritable.  Patient and caregiver are agreeable to this plan of care.    Plan:   Continue  levetiracetam 750 mg and 750 mg pm     Ambulatory EEG 48 hours to characterize staring spells at home.     If these are seizure then we can start oxcarbazepine. Monitor rash, falls, mood changes, dizziness, falls  Week 1: 75 mg morning   Week 2: 150 mg morning   Week 3: 150 mg morning and 75 mg night   Week 4-onward: 150 mg morning and 150 mg night     Follow up after ambulatory EEG     I spent 15 minutes for patient's medical care. During this time key medical decisions were made with review of medical chart prior to visit, visit with patient, counseling/education, and post visit work, including documentation on the day of visit. I addressed all questions the patient/caregiver raised in regards to the patient's medical care. This note was created with voice recognition software. Inadvertent grammatical errors, typographical errors, and \"sound a like\" substitutions may occur due to limitations of the software.  Read the note carefully and apply context when erroneous substitutions have occurred. Thank you.     Deneen Frazier MD                     "

## 2021-07-27 NOTE — PATIENT INSTRUCTIONS
Continue  levetiracetam 750 mg and 750 mg pm     Ambulatory EEG 48 hours to characterize staring spells at home.     If these are seizure then we can start oxcarbazepine. Monitor rash, falls, mood changes, dizziness, falls  Week 1: 75 mg morning   Week 2: 150 mg morning   Week 3: 150 mg morning and 75 mg night   Week 4-onward: 150 mg morning and 150 mg night     Follow up after ambulatory EEG     Deneen Frazier MD

## 2021-07-27 NOTE — LETTER
"2021       RE: Kindra Holt  : 1945   MRN: [id number]      Dear Colleague,    Thank you for referring your patient, Kindra Holt, to the [facility] EPILEPSY CARE at [facility]. Please see a copy of my visit note below.    Kindra is a 75 year old who is being evaluated via a billable video visit.      How would you like to obtain your AVS? Maranda  If the video visit is dropped, the invitation should be resent by: Send to e-mail at: [email or web address]  Will anyone else be joining your video visit? Yes: Carly. How would they like to receive their invitation? Other e-mail: [email or web address] will be with patient       Video Start Time: 11:21 AM  Video-Visit Details    Type of service:  Video Visit    Video End Time:11:34 AM    Originating Location (pt. Location): Home    Distant Location (provider location):  [facility] EPILEPSY CARE     Platform used for Video Visit: [employer]   If any questions call Carly at : [phone]      [facility] Epilepsy Care Progress Note    Patient:  Kindra Holt  :  1945   Age:  75 year old   Today's Visit:  2021    HPI: Joined with staff member carly. In  we stopped lamotrigine and her rash improved. Her skin was NOT red today. She has intermittent flare up and topical medications helps. She has skin sensitivity and redness rarely. There are concerns sun may worsen sensitivity. They saw dermatologist and reviewed starting oxcarbazepine.      She has staring spells twice a week. Spells last 1 minutes, \"she looks like she is looking pass me\",     Last event concerning for seizure was 10/2/2020 (no confusion with increased burping/vomiting) and last clear seizure was 9/10/2020 (confused), prior to this she had a spell 2020 with UTI. Since we increased levetiracetam she is more irritable. Currently, her skin is red and its not clear why.     Spell Type 1: These are described as  \"odd spells, vomited several " "times, dud not respond, eyes were closed, increased drooling, very confused, did not follow commands, she is not able remember how to turn on faucet, speech slurred\".  No clear warning sign. Before June 2020 she had these spells daily.     Caregiver denies dizziness, no double vision, no nausea, no vomiting, no abdominal pain, no mood changes, no ER visits, no hospitalizations.  She has been a little irritable and defiant. On this visit we spoke about patient's seizures, antiepileptic drug, and plan of epilepsy are. Patient/caregiver was agreeable with plan of care.     Current antiepileptic drug:   Levetiracetam 750 mg twice a day     Impression:   Recurrent stereotyped spells concerning for focal impaired seizure that progress to generalized tonic-clonic convulsion   Lamotrigine induced sun sensitivity rash rarely    History of DM and CKD  History mild cognitive impairment and mild mental retardation   History of questionable TIA (slurred speech with no acute changes on MRI brain, these may have been seizures)    Discussion:   Discussion: 75 year old RHF with history of epilepsy, DM, and CKD on monotherapy levetiracetam for diagnosis of epilepsy.  Has recurrent episodes of slurred speech and confusion which were thought to be possible TIAs.  However MRI of the brain's were unremarkable for stroke or acute changes.  Her last spell was 9/10/2020. Last convulsion was 6/6/2020. Started lamotrigine 7/1/2020 and she developed lamotrigine induced maculopapular rash in sun exposed areas (face/neck/forearm) with itching, no bleeding or ulceration noted. After stopping lamotrigine her rash resolved.      She has staring spells twice a week.  Its not clear to me if these are epileptic spells or her dazing off during the day.  It would be helpful to complete an ambulatory 48-hour study to evaluate for seizure activity.  I am reluctant to add another seizure medication due to side effect sensitivity and fatigue.  If we are to " "use the medication I recommend we try oxcarbazepine.  If she has a rash to oxcarbazepine we can try to transition her to Vimpat.  I am not increasing levetiracetam because she has mood changes with levetiracetam.  She is extremely kind, however with levetiracetam she gets very irritable.  Patient and caregiver are agreeable to this plan of care.    Plan:   Continue  levetiracetam 750 mg and 750 mg pm     Ambulatory EEG 48 hours to characterize staring spells at home.     If these are seizure then we can start oxcarbazepine. Monitor rash, falls, mood changes, dizziness, falls  Week 1: 75 mg morning   Week 2: 150 mg morning   Week 3: 150 mg morning and 75 mg night   Week 4-onward: 150 mg morning and 150 mg night     Follow up after ambulatory EEG     I spent 15 minutes for patient's medical care. During this time key medical decisions were made with review of medical chart prior to visit, visit with patient, counseling/education, and post visit work, including documentation on the day of visit. I addressed all questions the patient/caregiver raised in regards to the patient's medical care. This note was created with voice recognition software. Inadvertent grammatical errors, typographical errors, and \"sound a like\" substitutions may occur due to limitations of the software.  Read the note carefully and apply context when erroneous substitutions have occurred. Thank you.     Deneen Frazier MD                         Again, thank you for allowing me to participate in the care of your patient.      Sincerely,    Deneen Frazier MD      "

## 2021-07-27 NOTE — LETTER
Date:August 8, 2021      Patient was self referred, no letter generated. Do not send.        [facility] Health Information

## 2021-07-30 ENCOUNTER — TELEPHONE (OUTPATIENT)
Dept: NEUROLOGY | Facility: CLINIC | Age: 76
End: 2021-07-30

## 2021-08-24 DIAGNOSIS — G40.919 INTRACTABLE EPILEPSY WITHOUT STATUS EPILEPTICUS, UNSPECIFIED EPILEPSY TYPE (H): ICD-10-CM

## 2021-08-24 RX ORDER — LEVETIRACETAM 250 MG/1
TABLET ORAL
Qty: 240 TABLET | Refills: 11 | Status: SHIPPED | OUTPATIENT
Start: 2021-08-24 | End: 2021-10-01

## 2021-08-24 NOTE — PROGRESS NOTES
Increase levetiracetam 1000 mg twice a day, recent generalized tonic-clonic convulsion. Emailed care giver. Deneen Frazier MD

## 2021-09-06 ENCOUNTER — MYC MEDICAL ADVICE (OUTPATIENT)
Dept: NEUROLOGY | Facility: CLINIC | Age: 76
End: 2021-09-06

## 2021-09-16 ENCOUNTER — VIRTUAL VISIT (OUTPATIENT)
Dept: NEUROLOGY | Facility: CLINIC | Age: 76
End: 2021-09-16
Payer: COMMERCIAL

## 2021-09-16 DIAGNOSIS — G40.919 INTRACTABLE EPILEPSY WITHOUT STATUS EPILEPTICUS, UNSPECIFIED EPILEPSY TYPE (H): Primary | ICD-10-CM

## 2021-09-16 NOTE — PROGRESS NOTES
"  Kindra is a 75 year old who is being evaluated via a billable video visit.      How would you like to obtain your AVS? [email or web address]  If the video visit is dropped, the invitation should be resent by: Send to e-mail at: [email or web address]  Will anyone else be joining your video visit? Yes: Carly. How would they like to receive their invitation? Other e-mail: [employer] will be with patient       Video Start Time: 4 PM  Video-Visit Details    Type of service:  Video Visit    Video End Time:4: 30 pm     Originating Location (pt. Location): Home    Distant Location (provider location):  [facility] EPILEPSY CARE     Platform used for Video Visit: [employer]  If any questions call Carly at : [phone]      [facility] Epilepsy Care Progress Note    Patient:  Kindra Holt  :  1945   Age:  75 year old   Today's Visit:  2021    HPI: Joined with staff member carly and gurvinder. She continues to have some \"starting spells\" few times per day. These are described as \"it looks like someone is not paying attention, when we call her name she seems to coming back, looks day dreaming\". She took housemates medications accidentally which included citalopram, Clozaril, spirolactone. There was a medication error, she had to go to ER during which time she was obtunded and vomiting. There were concerns for status epilepticus (obtunded with vomiting). She was intubated and EEG (CED) showed no seizures. She had no convulsions. In the past her seizure consisted of vomiting and confusion. On last visit she had staring spells, they were not able to do ambulatory  EEG. With increase in levetiracetam she is \"crabby, getting mad at her\".     Clinical Correlation 2021:  This EEG demonstrates generalized   abnormalities, which imply diffuse or bilateral cortical   dysfunction. Generalized abnormalities may be related to genetic,   metabolic, degenerative, structural, vascular, or other   pathologies.  No seizures or " "epileptiform features. Clinical   correlation is recommended.      Spell Type 1: These are described as  \"odd spells, vomited several times, dud not respond, eyes were closed, increased drooling, very confused, did not follow commands, she is not able remember how to turn on faucet, speech slurred\".  No clear warning sign. Before June 2020 she had these spells daily.     Caregiver denies dizziness, no double vision, no nausea, no vomiting, no abdominal pain, no mood changes, no ER visits, no hospitalizations.  She has been a little irritable and defiant. On this visit we spoke about patient's seizures, antiepileptic drug, and plan of epilepsy are. Patient/caregiver was agreeable with plan of care.     Current antiepileptic drug:   Levetiracetam 1000 mg twice a day     Impression:   Recurrent stereotyped spells concerning for focal impaired seizure that progress to generalized tonic-clonic convulsion   Lamotrigine induced sun sensitivity rash rarely    History of DM and CKD  History mild cognitive impairment and mild mental retardation   History of questionable TIA (slurred speech with no acute changes on MRI brain, these may have been seizures)    Discussion:   Discussion: 75 year old RHF with history of epilepsy, DM, and CKD on monotherapy levetiracetam for epilepsy.  She had recent hospital admission with intubation concerns for status (however, she took her housemates medications accidentally which included citalopram, Clozaril, spirolactone)     Last convulsion was 6/6/2020. Started lamotrigine 7/1/2020 and she developed lamotrigine induced maculopapular rash in sun exposed areas (face/neck/forearm) with itching, no bleeding or ulceration noted. After stopping lamotrigine her rash resolved.      She has staring spells, I am not sure if these are seizures. We should get ambulatory EEG. She is irritable on levetiracetam.     Its not clear to me if these are epileptic spells or her dazing off during the day.  It " "would be helpful to complete an ambulatory 48-hour study to evaluate for seizure activity.  I am reluctant to add another seizure medication due to side effect sensitivity and fatigue.      If we are to use the medication I recommend we try oxcarbazepine.  If she has a rash to oxcarbazepine we can try to transition her to Vimpat.  I am not increasing levetiracetam because she has mood changes with levetiracetam.  She is extremely kind, however with levetiracetam she gets very irritable.  Patient and caregiver are agreeable to this plan of care.    Plan:   Continue  levetiracetam 1000 mg twice a day     Ambulatory EEG 48 hours to characterize staring spells at home.     If these are seizure are FOCAL  then we can start oxcarbazepine. Monitor rash, falls, mood changes, dizziness, falls  Week 1: 75 mg morning   Week 2: 150 mg morning   Week 3: 150 mg morning and 75 mg night   Week 4-onward: 150 mg morning and 150 mg night     Follow up after ambulatory EEG     I spent 28 minutes for patient's medical care. During this time key medical decisions were made with review of medical chart prior to visit, visit with patient, counseling/education, and post visit work, including documentation on the day of visit. I addressed all questions the patient/caregiver raised in regards to the patient's medical care. This note was created with voice recognition software. Inadvertent grammatical errors, typographical errors, and \"sound a like\" substitutions may occur due to limitations of the software.  Read the note carefully and apply context when erroneous substitutions have occurred. Thank you.     Deneen Frazier MD                     "

## 2021-09-16 NOTE — LETTER
Date:October 2, 2021      Patient was self referred, no letter generated. Do not send.        [facility] Health Information

## 2021-09-16 NOTE — LETTER
"2021       RE: Kindra Holt  : 1945   MRN: [id number]      Dear Colleague,    Thank you for referring your patient, Kindra Holt, to the [facility] EPILEPSY CARE at [facility]. Please see a copy of my visit note below.      Kindra is a 75 year old who is being evaluated via a billable video visit.      How would you like to obtain your AVS? Maranda  If the video visit is dropped, the invitation should be resent by: Send to e-mail at: [email or web address]  Will anyone else be joining your video visit? Yes: Carly. How would they like to receive their invitation? Other e-mail: [employer] will be with patient       Video Start Time: 4 PM  Video-Visit Details    Type of service:  Video Visit    Video End Time:4: 30 pm     Originating Location (pt. Location): Home    Distant Location (provider location):  [facility] EPILEPSY CARE     Platform used for Video Visit: [employer] and [employer]  If any questions call Carly at : [phone]      [facility] Epilepsy Care Progress Note    Patient:  Kindra Holt  :  1945   Age:  75 year old   Today's Visit:  2021    HPI: Joined with staff member josé luis. She continues to have some \"starting spells\" few times per day. These are described as \"it looks like someone is not paying attention, when we call her name she seems to coming back, looks day dreaming\". She took housemates medications accidentally which included citalopram, Clozaril, spirolactone. There was a medication error, she had to go to ER during which time she was obtunded and vomiting. There were concerns for status epilepticus (obtunded with vomiting). She was intubated and EEG (CED) showed no seizures. She had no convulsions. In the past her seizure consisted of vomiting and confusion. On last visit she had staring spells, they were not able to do ambulatory  EEG. With increase in levetiracetam she is \"crabby, getting mad at her\". " "    Clinical Correlation 9/2021:  This EEG demonstrates generalized   abnormalities, which imply diffuse or bilateral cortical   dysfunction. Generalized abnormalities may be related to genetic,   metabolic, degenerative, structural, vascular, or other   pathologies.  No seizures or epileptiform features. Clinical   correlation is recommended.      Spell Type 1: These are described as  \"odd spells, vomited several times, dud not respond, eyes were closed, increased drooling, very confused, did not follow commands, she is not able remember how to turn on faucet, speech slurred\".  No clear warning sign. Before June 2020 she had these spells daily.     Caregiver denies dizziness, no double vision, no nausea, no vomiting, no abdominal pain, no mood changes, no ER visits, no hospitalizations.  She has been a little irritable and defiant. On this visit we spoke about patient's seizures, antiepileptic drug, and plan of epilepsy are. Patient/caregiver was agreeable with plan of care.     Current antiepileptic drug:   Levetiracetam 1000 mg twice a day     Impression:   Recurrent stereotyped spells concerning for focal impaired seizure that progress to generalized tonic-clonic convulsion   Lamotrigine induced sun sensitivity rash rarely    History of DM and CKD  History mild cognitive impairment and mild mental retardation   History of questionable TIA (slurred speech with no acute changes on MRI brain, these may have been seizures)    Discussion:   Discussion: 75 year old RHF with history of epilepsy, DM, and CKD on monotherapy levetiracetam for epilepsy.  She had recent hospital admission with intubation concerns for status (however, she took her housemates medications accidentally which included citalopram, Clozaril, spirolactone)     Last convulsion was 6/6/2020. Started lamotrigine 7/1/2020 and she developed lamotrigine induced maculopapular rash in sun exposed areas (face/neck/forearm) with itching, no bleeding or " "ulceration noted. After stopping lamotrigine her rash resolved.      She has staring spells, I am not sure if these are seizures. We should get ambulatory EEG. She is irritable on levetiracetam.     Its not clear to me if these are epileptic spells or her dazing off during the day.  It would be helpful to complete an ambulatory 48-hour study to evaluate for seizure activity.  I am reluctant to add another seizure medication due to side effect sensitivity and fatigue.      If we are to use the medication I recommend we try oxcarbazepine.  If she has a rash to oxcarbazepine we can try to transition her to Vimpat.  I am not increasing levetiracetam because she has mood changes with levetiracetam.  She is extremely kind, however with levetiracetam she gets very irritable.  Patient and caregiver are agreeable to this plan of care.    Plan:   Continue  levetiracetam 1000 mg twice a day     Ambulatory EEG 48 hours to characterize staring spells at home.     If these are seizure are FOCAL  then we can start oxcarbazepine. Monitor rash, falls, mood changes, dizziness, falls  Week 1: 75 mg morning   Week 2: 150 mg morning   Week 3: 150 mg morning and 75 mg night   Week 4-onward: 150 mg morning and 150 mg night     Follow up after ambulatory EEG     I spent 28 minutes for patient's medical care. During this time key medical decisions were made with review of medical chart prior to visit, visit with patient, counseling/education, and post visit work, including documentation on the day of visit. I addressed all questions the patient/caregiver raised in regards to the patient's medical care. This note was created with voice recognition software. Inadvertent grammatical errors, typographical errors, and \"sound a like\" substitutions may occur due to limitations of the software.  Read the note carefully and apply context when erroneous substitutions have occurred. Thank you.     Deneen Frazier MD                         Again, thank " you for allowing me to participate in the care of your patient.      Sincerely,    Deneen Frazier MD

## 2021-09-16 NOTE — PATIENT INSTRUCTIONS
Continue  levetiracetam 1000 mg twice a day     Ambulatory EEG 48 hours to characterize staring spells at home.     Follow up  After EEG     Deneen Frazier MD

## 2021-09-30 DIAGNOSIS — G40.919 INTRACTABLE EPILEPSY WITHOUT STATUS EPILEPTICUS, UNSPECIFIED EPILEPSY TYPE (H): ICD-10-CM

## 2021-10-01 RX ORDER — LEVETIRACETAM 1000 MG/1
TABLET ORAL
Qty: 60 TABLET | Refills: 11 | Status: SHIPPED | OUTPATIENT
Start: 2021-10-01 | End: 2022-01-14

## 2021-10-01 NOTE — TELEPHONE ENCOUNTER
levETIRAcetam (KEPPRA) 250 MG tablet   Last Written Prescription Date: 8/24/21  Last Fill Quantity: 240,   # refills: 11  Last Office Visit : 9/16/21  Future Office visit:  10/14/21    Routing refill request to provider for review/approval because: pharm requests 1000 mg tabs. However, hosp discharge papers say 750 mg bid. As seen on requested order. Thanks.

## 2021-10-10 ENCOUNTER — HEALTH MAINTENANCE LETTER (OUTPATIENT)
Age: 76
End: 2021-10-10

## 2021-10-18 ENCOUNTER — ANCILLARY PROCEDURE (OUTPATIENT)
Dept: NEUROLOGY | Facility: CLINIC | Age: 76
End: 2021-10-18
Attending: PSYCHIATRY & NEUROLOGY
Payer: COMMERCIAL

## 2021-10-18 DIAGNOSIS — G40.919 INTRACTABLE EPILEPSY WITHOUT STATUS EPILEPTICUS, UNSPECIFIED EPILEPSY TYPE (H): ICD-10-CM

## 2021-10-19 ENCOUNTER — ANCILLARY PROCEDURE (OUTPATIENT)
Dept: NEUROLOGY | Facility: CLINIC | Age: 76
End: 2021-10-19
Attending: PSYCHIATRY & NEUROLOGY
Payer: COMMERCIAL

## 2021-10-20 ENCOUNTER — ANCILLARY PROCEDURE (OUTPATIENT)
Dept: NEUROLOGY | Facility: CLINIC | Age: 76
End: 2021-10-20
Attending: PSYCHIATRY & NEUROLOGY
Payer: COMMERCIAL

## 2021-11-04 ENCOUNTER — VIRTUAL VISIT (OUTPATIENT)
Dept: NEUROLOGY | Facility: CLINIC | Age: 76
End: 2021-11-04
Payer: COMMERCIAL

## 2021-11-04 DIAGNOSIS — R41.3 MEMORY DEFICITS: ICD-10-CM

## 2021-11-04 DIAGNOSIS — G40.919 INTRACTABLE EPILEPSY WITHOUT STATUS EPILEPTICUS, UNSPECIFIED EPILEPSY TYPE (H): ICD-10-CM

## 2021-11-04 DIAGNOSIS — R40.4 NONSPECIFIC PAROXYSMAL SPELL: Primary | ICD-10-CM

## 2021-11-04 RX ORDER — OXCARBAZEPINE 150 MG/1
TABLET, FILM COATED ORAL
Qty: 90 TABLET | Refills: 11 | Status: SHIPPED | OUTPATIENT
Start: 2021-11-04 | End: 2022-01-14

## 2021-11-04 NOTE — LETTER
Date:November 21, 2021      Patient was self referred, no letter generated. Do not send.        [facility] Health Information

## 2021-11-04 NOTE — LETTER
"2021       RE: Kindra Holt  : 1945   MRN: [id number]      Dear Colleague,    Thank you for referring your patient, Kindra Holt, [place] [facility] at [facility]. Please see a copy of my visit note below.    Kindra is a 76 year old who is being evaluated via a billable video visit.      How would you like to obtain your AVS? MyChart  If the video visit is dropped, the invitation should be resent by: Text to cell phone: Call [phone]  Will anyone else be joining your video visit? Yes: Alexsandra, alessandra home. How would they like to receive their invitation? Text to cell phone: cell      Video Start Time: 1:38 PM  Video-Visit Details    Type of service:  Video Visit    Video End Time:2:07  pm     Originating Location (pt. Location): Home    Distant Location (provider location):  [facility]     Platform used for Video Visit: [employer] video dropped and I had to call them       [facility] [place] Note    Patient:  Kindra Holt  :  1945   Age:  75 year old   Today's Visit:  2021    HPI: Joined with staff member gurvinder. She continues to have some \"staring spells\", but, it seems less than before. Gurvinder thinks she may have a staring spell once a week. When she is asked are you ok, she will respond. She has more forgetfullness, more defiant, more issues with hygiene (not wiping her bottom). We reviewed cross reactivity with oxcarbazepine and lamotrigine for rash. Last 2021 (there were concerns for status epilepticus (obtunded with vomiting). She was intubated and EEG (CED) showed no seizures. She had no convulsions. At baseline her seizure consist of vomiting and confusion. With increase in levetiracetam she is \"crabby, getting mad at her\". No ER visits since last visit. I reviewed ambulatory EEG, normal for two days.     Spell Type 1: These are described as  \"odd spells, vomited several times, dud not " "respond, eyes were closed, increased drooling, very confused, did not follow commands, she is not able remember how to turn on faucet, speech slurred, stiffens\".  No clear warning sign. Before June 2020 she had these spells daily.     Caregiver denies dizziness, no double vision, no nausea, no vomiting, no abdominal pain, no mood changes, no ER visits, no hospitalizations.  She has been a little irritable and defiant. On this visit we spoke about patient's seizures, antiepileptic drug, and plan of epilepsy are. Patient/caregiver was agreeable with plan of care.     Current antiepileptic drug:   Levetiracetam 1000 mg twice a day     Impression:   Recurrent stereotyped spells concerning for focal impaired seizure that progress to generalized tonic-clonic convulsion   Lamotrigine induced sun sensitivity rash rarely    History of DM and CKD  History mild cognitive impairment and mild mental retardation   History of questionable TIA (slurred speech with no acute changes on MRI brain, these may have been seizures)    Discussion:   Discussion: 75 year old RHF with history of epilepsy, DM, and CKD on monotherapy levetiracetam for epilepsy.  Last focal seizures with impairment in awareness  8/2021. She has mood issues on levetiracetam (irritable, defiant, poor self care, isolates herself).     In 7/2020 she developed lamotrigine induced maculopapular rash in sun exposed areas (face/neck/forearm) with itching, no bleeding or ulceration noted. After stopping lamotrigine her rash resolved.  Oxcarbazepine has potential for rash, but, she is not tolerating levetiracetam. She is irritable on levetiracetam.     If she has a rash to oxcarbazepine we can try to transition her to Vimpat.  I am not increasing levetiracetam because she has mood changes with levetiracetam.  She is extremely kind, however with levetiracetam she gets very irritable.  Patient and caregiver are agreeable to this plan of care.    Plan:   Continue  " "levetiracetam 1000 mg twice a day       Week 1: 75 mg morning (1/2 tablet)  Week 2: 150 mg morning (1 tablet)   Week 3: 150 mg morning and 75 mg night (1 tablet morning and 1/2 tablet night)   Week 4: 150 mg morning and 150 mg night (1 tablet twice a day)   Week 5: 150 mg morning and 225 mg night (1 tablet morning and 1.5 tablet night)   Week 6-onward: 150 mg morning and 300 mg night (1 tablet morning and 2 tablet night)     Week 7 - check labs     Will mail labs (ast, alt, na+, oxcarbazepine)    Neuropsychology for memory deficits     Follow up  6-8 weeks      I spent 28 minutes for patient's medical care. During this time key medical decisions were made with review of medical chart prior to visit, visit with patient, counseling/education, and post visit work, including documentation on the day of visit. I addressed all questions the patient/caregiver raised in regards to the patient's medical care. This note was created with voice recognition software. Inadvertent grammatical errors, typographical errors, and \"sound a like\" substitutions may occur due to limitations of the software.  Read the note carefully and apply context when erroneous substitutions have occurred. Thank you.     Deneen Frazier MD                         Again, thank you for allowing me to participate in the care of your patient.      Sincerely,    Deneen Frazier MD      "

## 2021-11-04 NOTE — PATIENT INSTRUCTIONS
Continue  levetiracetam 1000 mg twice a day     Start oxcarbazepine - monitor for RASH closely   Week 1: 75 mg morning (1/2 tablet)  Week 2: 150 mg morning (1 tablet)   Week 3: 150 mg morning and 75 mg night (1 tablet morning and 1/2 tablet night)   Week 4: 150 mg morning and 150 mg night (1 tablet twice a day)   Week 5: 150 mg morning and 225 mg night (1 tablet morning and 1.5 tablet night)   Week 6-onward: 150 mg morning and 300 mg night (1 tablet morning and 2 tablet night)     Week 7 - check labs     Will mail labs (ast, alt, na+, oxcarbazepine)    Neuropsychology for memory deficits     Follow up  6-8 weeks      Deneen Frazier MD

## 2021-11-04 NOTE — PROGRESS NOTES
"Kindra is a 76 year old who is being evaluated via a billable video visit.      How would you like to obtain your AVS? MyChart  If the video visit is dropped, the invitation should be resent by: Text to cell phone: Call [phone]  Will anyone else be joining your video visit? Yes: Alexsandraalessandra home. How would they like to receive their invitation? Text to cell phone: cell      Video Start Time: 1:38 PM  Video-Visit Details    Type of service:  Video Visit    Video End Time:2:07  pm     Originating Location (pt. Location): Home    Distant Location (provider location):  [facility] EPILEPSY CARE     Platform used for Video Visit: AmWell video dropped and I had to call them       [facility] [place] [facility]    Patient:  Kindra Holt  :  1945   Age:  75 year old   Today's Visit:  2021    HPI: Joined with staff member gurvinder. She continues to have some \"staring spells\", but, it seems less than before. Gurvinder thinks she may have a staring spell once a week. When she is asked are you ok, she will respond. She has more forgetfullness, more defiant, more issues with hygiene (not wiping her bottom). We reviewed cross reactivity with oxcarbazepine and lamotrigine for rash. Last 2021 (there were concerns for status epilepticus (obtunded with vomiting). She was intubated and EEG (CED) showed no seizures. She had no convulsions. At baseline her seizure consist of vomiting and confusion. With increase in levetiracetam she is \"crabby, getting mad at her\". No ER visits since last visit. I reviewed ambulatory EEG, normal for two days.     Spell Type 1: These are described as  \"odd spells, vomited several times, dud not respond, eyes were closed, increased drooling, very confused, did not follow commands, she is not able remember how to turn on faucet, speech slurred, stiffens\".  No clear warning sign. Before 2020 she had these spells daily.     Caregiver denies dizziness, no double vision, no nausea, " no vomiting, no abdominal pain, no mood changes, no ER visits, no hospitalizations.  She has been a little irritable and defiant. On this visit we spoke about patient's seizures, antiepileptic drug, and plan of epilepsy are. Patient/caregiver was agreeable with plan of care.     Current antiepileptic drug:   Levetiracetam 1000 mg twice a day     Impression:   Recurrent stereotyped spells concerning for focal impaired seizure that progress to generalized tonic-clonic convulsion   Lamotrigine induced sun sensitivity rash rarely    History of DM and CKD  History mild cognitive impairment and mild mental retardation   History of questionable TIA (slurred speech with no acute changes on MRI brain, these may have been seizures)    Discussion:   Discussion: 75 year old RHF with history of epilepsy, DM, and CKD on monotherapy levetiracetam for epilepsy.  Last focal seizures with impairment in awareness  8/2021. She has mood issues on levetiracetam (irritable, defiant, poor self care, isolates herself).     In 7/2020 she developed lamotrigine induced maculopapular rash in sun exposed areas (face/neck/forearm) with itching, no bleeding or ulceration noted. After stopping lamotrigine her rash resolved.  Oxcarbazepine has potential for rash, but, she is not tolerating levetiracetam. She is irritable on levetiracetam.     If she has a rash to oxcarbazepine we can try to transition her to Vimpat.  I am not increasing levetiracetam because she has mood changes with levetiracetam.  She is extremely kind, however with levetiracetam she gets very irritable.  Patient and caregiver are agreeable to this plan of care.    Plan:   Continue  levetiracetam 1000 mg twice a day       Week 1: 75 mg morning (1/2 tablet)  Week 2: 150 mg morning (1 tablet)   Week 3: 150 mg morning and 75 mg night (1 tablet morning and 1/2 tablet night)   Week 4: 150 mg morning and 150 mg night (1 tablet twice a day)   Week 5: 150 mg morning and 225 mg night (1  "tablet morning and 1.5 tablet night)   Week 6-onward: 150 mg morning and 300 mg night (1 tablet morning and 2 tablet night)     Week 7 - check labs     Will mail labs (ast, alt, na+, oxcarbazepine)    Neuropsychology for memory deficits     Follow up  6-8 weeks      I spent 28 minutes for patient's medical care. During this time key medical decisions were made with review of medical chart prior to visit, visit with patient, counseling/education, and post visit work, including documentation on the day of visit. I addressed all questions the patient/caregiver raised in regards to the patient's medical care. This note was created with voice recognition software. Inadvertent grammatical errors, typographical errors, and \"sound a like\" substitutions may occur due to limitations of the software.  Read the note carefully and apply context when erroneous substitutions have occurred. Thank you.     Deneen Frazier MD                     "

## 2021-12-21 ENCOUNTER — MYC MEDICAL ADVICE (OUTPATIENT)
Dept: NEUROLOGY | Facility: CLINIC | Age: 76
End: 2021-12-21
Payer: COMMERCIAL

## 2021-12-21 ENCOUNTER — TELEPHONE (OUTPATIENT)
Dept: NEUROLOGY | Facility: CLINIC | Age: 76
End: 2021-12-21
Payer: COMMERCIAL

## 2021-12-21 NOTE — TELEPHONE ENCOUNTER
"Call placed to Alexsandra at \"[facility]\".  Patient was doing will until last last week.  She started having behavior issues.  Patient has started talking about a delusional person \"Jerod\" whom she used to refer to  These delusions have not happened for many years    Patient is described as \"Feisty and defiant\" when normally she is fairly easy going and relaxed.  Blood sugar has been higher than for a while, and staff are working with this  UA was checked as was negative for signs of infection  Patient started throwing up her food at work today but this was more than 2.5 hours after taking her meds, so there is little concern she missed medications.  Also vomited on Friday but had no problems over the weekend  Patient is due to increase to week six of the increase today.  Will discuss with  for recommendations about increase or stay at week 5.    Blood can be drawn today.  Orders would need to go to [facility] (faxed by  staff)    Will call back with MD recommendations, follow up at the clinic visit as scheduled tomorrow  "

## 2021-12-21 NOTE — TELEPHONE ENCOUNTER
Bring vaccine record. PATIENT INFORMATION    Anticipatory guidance discussed  Bicycle helmets  Chores and other responsibilities  Drugs, ETOH, and tobacco  Importance of regular dental care  Importance of regular exercise  Importance of varied diet  Library card; limiting TV, media violence  Minimize junk food  Puberty  Safe storage of any firearms in the home  Seat belts  Smoke detectors; home fire drills  Teach child how to deal with strangers  Teach pedestrian safety    Follow-Up  - Return for your yearly well child visit. 89 years old Health and Safety Tips - The following hyperlinks are available to access via frooly    Parent Education from [employer]    EducaciÃ³n para padres sobre niÃ±os sanos    Additional Educational Resources: For additional resources regarding your symptoms, diagnosis, or further health information, please visit the Discover a Healthier You section on [email or web address] or the [employer] section in [facility]. Phone call to clinic has also been initiated by facility staff.  Appointment is scheduled for 12/22/2021  See other encounter

## 2021-12-21 NOTE — TELEPHONE ENCOUNTER
What is the concern that needs to be addressed by a nurse? Patient is now at week 6 of  new medication and she's starting to behave oddly, some potential hallucinations and is throwing up today. Alexsandra is wondering if it's too much medication?     May a detailed message be left on voicemail? Yes    Date of last office visit: 11/4/21  Did schedule appointment for tomorrow 12/22 too  Message routed to: Jona Youssef

## 2021-12-21 NOTE — TELEPHONE ENCOUNTER
Discussed with   Do not make the increase to week six - will discuss at visit tomorrow.      Lonnie placed to residence.  First call disconnected as soon as it was answered  Second call, spoke with Jose who reported he is not able to talk about residents, nad recommended a call back later.  Will need to call tomorrow, or discuss as follow up appointmetn tomorrow

## 2021-12-22 ENCOUNTER — VIRTUAL VISIT (OUTPATIENT)
Dept: NEUROLOGY | Facility: CLINIC | Age: 76
End: 2021-12-22
Payer: COMMERCIAL

## 2021-12-22 DIAGNOSIS — R41.3 MEMORY DEFICITS: Primary | ICD-10-CM

## 2021-12-22 NOTE — PATIENT INSTRUCTIONS
Lower levetiracetam 500 mg morning and 1000 mg night   Continue 150 mg morning and 300 mg night (1 tablet morning and 2 tablet night)   Monitor for seizure     Labs pending (if Na+ is low less than 132 please review with nephrologist). She will require less water 60oz per day  Also, talk to endocrinologist about blood glucose control   Neuropsychology testing for memory and hallucination for Lewy body dementia.     [facility]     Neuropsychology for memory deficits     Follow up  4-6 weeks      Deneen Frazier MD

## 2021-12-22 NOTE — LETTER
Date:December 24, 2021      Patient was self referred, no letter generated. Do not send.        [facility] Health Information

## 2021-12-22 NOTE — LETTER
"2021       RE: Kindra Holt  : 1945   MRN: [id number]      Dear Colleague,    Thank you for referring your patient, Kindra Holt, to the [facility] EPILEPSY CARE at [facility]. Please see a copy of my visit note below.    Kindra is a 76 year old who is being evaluated via a billable video visit.      How would you like to obtain your AVS? Mail a copy  If the video visit is dropped, the invitation should be resent by: Send to e-mail at: [email or web address]  Will anyone else be joining your video visit? Yes: staff. How would they like to receive their invitation? Send to e-mail at: [employer]      Video Start Time: 11:23 AM  Video-Visit Details    Type of service:  Video Visit    Video End Time:11:45 am     Originating Location (pt. Location): Home    Distant Location (provider location):  [facility] EPILEPSY CARE     Platform used for Video Visit: [facility] Epilepsy Care Progress Note    Patient:  Kindra Holt  :  1945   Age:  75 year old   Today's Visit:  2021    HPI: Joined with staff member gurvinder. We started oxcarbazepine and she is not tolerating this, she is more \"defiant, feisty, hallucinating about Jerod her  cousins\".  She had hallucinations of Jerod one year ago (her BG was higher back  -653  then). Her BG has fluctuated 130-150, on average she has BG less than 112.  Since last visit she has not had any seizures. Her last seizure 2021.  She is refusing to drink water. she does not have fatigue on oxcarbazepine or a rash.     Spell Type 1: These are described as  \"odd spells, vomited several times, dud not respond, eyes were closed, increased drooling, very confused, did not follow commands, she is not able remember how to turn on faucet, speech slurred, stiffens\".  No clear warning sign. Before 2020 she had these spells daily.  Last spell was 2021.    Caregiver " denies dizziness, no double vision, no nausea, no vomiting, no abdominal pain, no mood changes, no ER visits, no hospitalizations.  She has been a little irritable and defiant. On this visit we spoke about patient's seizures, antiepileptic drug, and plan of epilepsy are. Patient/caregiver was agreeable with plan of care.     Current antiepileptic drug:   Levetiracetam 1000 mg twice a day   Oxcarbazepine 150 mg morning and 300 mg evening     Past antiepileptic drug-   Lamotrigine caused rash - 2020 she developed lamotrigine induced maculopapular rash in sun exposed areas (face/neck/forearm) with itching, no bleeding or ulceration noted. After stopping lamotrigine her rash resolved.     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  Last focal seizures with impairment in awareness  2021. She has mood issues on levetiracetam (irritable, defiant, poor self care, isolates herself).     In regards to antiseizure medication, she is very sensitive to medication changes.  She has a reemergence of hallucinations.  She is hallucinating her  cousin is in the room talking to her.  In the past this happened when she had hyperglycemia.  I am not sure if this is secondary to the oxcarbazepine or hyperglycemia or if there is an underlying dementia process such as Lewy body dementia.  It will be helpful to improve glycemic control and I would like to lower levetiracetam as this has worsened her mood over the last few years.  We will check sodium level on  "oxcarbazepine.  She is on a reasonable dose of oxcarbazepine, we will lower levetiracetam.  I did review with staff lowering seizure medication may precipitate a seizure, we can monitor her clinically for seizures at this time.    If she has further clinical side effects on oxcarbazepine we can try to transition her to Vimpat.  Patient and caregiver are agreeable to this plan of care.    Plan:   Lower levetiracetam 500 mg morning and 1000 mg night   Continue 150 mg morning and 300 mg night (1 tablet morning and 2 tablet night)   Monitor seizure     Labs pending (if Na+ is low please review with nephrologist)    Check with insurance on vimpat     Neuropsychology for memory deficits     Follow up  4-6 weeks      I spent 28 minutes for patient's medical care. During this time key medical decisions were made with review of medical chart prior to visit, visit with patient, counseling/education, and post visit work, including documentation on the day of visit. I addressed all questions the patient/caregiver raised in regards to the patient's medical care. This note was created with voice recognition software. Inadvertent grammatical errors, typographical errors, and \"sound a like\" substitutions may occur due to limitations of the software.  Read the note carefully and apply context when erroneous substitutions have occurred. Thank you.     Deneen Frazier MD                         Again, thank you for allowing me to participate in the care of your patient.      Sincerely,    Deneen Frazier MD      "

## 2021-12-22 NOTE — PROGRESS NOTES
"Kindra is a 76 year old who is being evaluated via a billable video visit.      How would you like to obtain your AVS? Mail a copy  If the video visit is dropped, the invitation should be resent by: Send to e-mail at: [email or web address]  Will anyone else be joining your video visit? Yes: staff. How would they like to receive their invitation? Send to e-mail at: [email or web address]      Video Start Time: 11:23 AM  Video-Visit Details    Type of service:  Video Visit    Video End Time:11:45 am     Originating Location (pt. Location): Home    Distant Location (provider location):  [facility] EPILEPSY CARE     Platform used for Video Visit: [employer]       [facility]    Patient:  Kindra Holt  :  1945   Age:  75 year old   Today's Visit:  2021    HPI: Joined with staff member gurvinder. We started oxcarbazepine and she is not tolerating this, she is more \"defiant, feisty, hallucinating about Jerod her  cousins\".  She had hallucinations of Jerod one year ago (her BG was higher back  -503  then). Her BG has fluctuated 130-150, on average she has BG less than 112.  Since last visit she has not had any seizures. Her last seizure 2021.  She is refusing to drink water. she does not have fatigue on oxcarbazepine or a rash.     Spell Type 1: These are described as  \"odd spells, vomited several times, dud not respond, eyes were closed, increased drooling, very confused, did not follow commands, she is not able remember how to turn on faucet, speech slurred, stiffens\".  No clear warning sign. Before 2020 she had these spells daily.  Last spell was 2021.    Caregiver denies dizziness, no double vision, no nausea, no vomiting, no abdominal pain, no mood changes, no ER visits, no hospitalizations.  She has been a little irritable and defiant. On this visit we spoke about patient's seizures, antiepileptic drug, and plan of epilepsy are. Patient/caregiver " was agreeable with plan of care.     Current antiepileptic drug:   Levetiracetam 1000 mg twice a day   Oxcarbazepine 150 mg morning and 300 mg evening     Past antiepileptic drug-   Lamotrigine caused rash - 2020 she developed lamotrigine induced maculopapular rash in sun exposed areas (face/neck/forearm) with itching, no bleeding or ulceration noted. After stopping lamotrigine her rash resolved.     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  Last focal seizures with impairment in awareness  2021. She has mood issues on levetiracetam (irritable, defiant, poor self care, isolates herself).     In regards to antiseizure medication, she is very sensitive to medication changes.  She has a reemergence of hallucinations.  She is hallucinating her  cousin is in the room talking to her.  In the past this happened when she had hyperglycemia.  I am not sure if this is secondary to the oxcarbazepine or hyperglycemia or if there is an underlying dementia process such as Lewy body dementia.  It will be helpful to improve glycemic control and I would like to lower levetiracetam as this has worsened her mood over the last few years.  We will check sodium level on oxcarbazepine.  She is on a reasonable dose of oxcarbazepine, we will lower levetiracetam.  I did review with staff lowering seizure medication may precipitate a seizure, we can monitor her clinically for seizures at this time.    If she has further clinical side effects on oxcarbazepine we can try to  "transition her to Vimpat.  Patient and caregiver are agreeable to this plan of care.    Plan:   Lower levetiracetam 500 mg morning and 1000 mg night   Continue 150 mg morning and 300 mg night (1 tablet morning and 2 tablet night)   Monitor seizure     Labs pending (if Na+ is low please review with nephrologist)    Check with insurance on vimpat     Neuropsychology for memory deficits     Follow up  4-6 weeks      I spent 28 minutes for patient's medical care. During this time key medical decisions were made with review of medical chart prior to visit, visit with patient, counseling/education, and post visit work, including documentation on the day of visit. I addressed all questions the patient/caregiver raised in regards to the patient's medical care. This note was created with voice recognition software. Inadvertent grammatical errors, typographical errors, and \"sound a like\" substitutions may occur due to limitations of the software.  Read the note carefully and apply context when erroneous substitutions have occurred. Thank you.     Deneen Frazier MD                     "

## 2022-01-03 DIAGNOSIS — G40.919 INTRACTABLE EPILEPSY WITHOUT STATUS EPILEPTICUS, UNSPECIFIED EPILEPSY TYPE (H): ICD-10-CM

## 2022-01-03 NOTE — TELEPHONE ENCOUNTER
Caregiver calls with notice that nephrologist asked to have oxcarbazepine discontinued due to hyponatremia and kidney disease. Na 119 today.   No change in seizures, no new symptoms in addition to the ones reported 12/22/21.     Routing to MD to advise of discontinuation orders for oxcarb.

## 2022-01-03 NOTE — TELEPHONE ENCOUNTER
What is the concern that needs to be addressed by a nurse? Pt needs to be taken off of OXcarbazepine (TRILEPTAL) 150 MG tablet immediately, per Dr. Conner, pt's nephrologist. rx is dangerous for her kidney levels.     May a detailed message be left on voicemail? yes    Date of last office visit: 12/22/21    Message routed to: mincep rn pool

## 2022-01-04 RX ORDER — LEVETIRACETAM 1000 MG/1
TABLET ORAL
Qty: 60 TABLET | Refills: 11 | Status: CANCELLED | OUTPATIENT
Start: 2022-01-04

## 2022-01-04 RX ORDER — FUROSEMIDE 40 MG
40 TABLET ORAL PRN
COMMUNITY

## 2022-01-04 NOTE — TELEPHONE ENCOUNTER
Deneen Frazier MD Vassar, Allison, RN  Caller: Unspecified (Yesterday,  1:27 PM)  I do not recommend abruptly stopping her oxcarbazepine as she may have seizures.  Reduce oxcarbazepine to 150 mg twice a day for 3 days, then reduce to 150 mg in the morning for 3 days and then stop.  She may have breakthrough seizures.  Please ask group home to check into coverage for Vimpat so we may start this medication.  We can just put a prescription in and see how it goes.  Week 1: 50 mg twice a day, week 2 - onward: 100 mg twice a day.             ---------------------------------------------------------------------------------  Instructions were called to nurse Alexsandra. She states that Dr. Conner instructed them to give a half dose of oxcarbazepine last night so she received 150 mg.     Currently, patient is on   -1000  OXC wean    There is concern with starting any medication with renal metabolism or excretion. Dr. Conner stated that he would be available to talk Dr. Frazier if she wishes. Dr. Burke Conner, Nephrology at [facility] [phone].    I advised of Dr. Frazier's orders to wean oxcarb as above. Will check with provider on starting vimpat because that is a renal medication as well.

## 2022-01-07 RX ORDER — LACOSAMIDE 50 MG/1
50 TABLET ORAL 2 TIMES DAILY
Qty: 62 TABLET | Refills: 5 | Status: SHIPPED | OUTPATIENT
Start: 2022-01-07 | End: 2022-01-14

## 2022-01-07 NOTE — TELEPHONE ENCOUNTER
D/w Dr. Frazier. Ok to proceed with Vimpat 50 mg BID. Contact nephrology team to give update/collaborate.  patient caregiver to monitor for chest pain, headache, fatigue. Advise to have PCP do annual EKG while on vimpat.     Voicemail was left for Dr. Conner's nurse (nephrology). vimpat order was sent to Dr. Frazier for signature.

## 2022-01-11 ENCOUNTER — MYC MEDICAL ADVICE (OUTPATIENT)
Dept: NEUROLOGY | Facility: CLINIC | Age: 77
End: 2022-01-11
Payer: COMMERCIAL

## 2022-01-13 NOTE — TELEPHONE ENCOUNTER
Follow up call placed to patient's group home nurse Alexsandra Diehl. Patient did not start vimpat yet; will wait until appointment tomorrow to discuss further. We reviewed the [employer] message sent by Dr. Frazier. Alexsandra had no immediate questions and will follow up with Dr. Frazier tomorrow.

## 2022-01-14 ENCOUNTER — VIRTUAL VISIT (OUTPATIENT)
Dept: NEUROLOGY | Facility: CLINIC | Age: 77
End: 2022-01-14
Payer: COMMERCIAL

## 2022-01-14 DIAGNOSIS — G40.919 INTRACTABLE EPILEPSY WITHOUT STATUS EPILEPTICUS, UNSPECIFIED EPILEPSY TYPE (H): ICD-10-CM

## 2022-01-14 RX ORDER — LEVETIRACETAM 1000 MG/1
TABLET ORAL
Qty: 135 TABLET | Refills: 3 | Status: SHIPPED | OUTPATIENT
Start: 2022-01-14 | End: 2023-01-16

## 2022-01-14 NOTE — PROGRESS NOTES
"Kindra is a 76 year old who is being evaluated via a billable video visit.      How would you like to obtain your AVS? Mail a copy  If the video visit is dropped, the invitation should be resent by: Send to e-mail at: [email or web address] Holdings  Will anyone else be joining your video visit? Yes: staff. How would they like to receive their invitation? Send to e-mail at: [employer]      Video Start Time: 3:02PM  Video-Visit Details    Type of service:  Video Visit    Video End Time:3:30PM    Originating Location (pt. Location): Home    Distant Location (provider location):  [facility] EPILEPSY CARE     Platform used for Video Visit: [employer]       [facility]    Patient:  Kindra Holt  :  1945   Age:  76 year old   Today's Visit:  2022    HPI: Joined with staff member gurvinder. We started oxcarbazepine and she is not tolerating this, she is more  \"aggression, defiant, feisty, hallucinating, mean, more flashbacks, accusing housemates, hitting house mate, very agitated\".  They stopped oxcarbazepine yesterday. In the past she had more mood issues with higher BG on levetiracetam monotherapy. She did not start vimpat. Caregivers would like to leave her on levetiracetam monotherapy.     Spell Type 1: These are described as  \"odd spells, vomited several times, dud not respond, eyes were closed, increased drooling, very confused, did not follow commands, she is not able remember how to turn on faucet, speech slurred, stiffens\".  No clear warning sign. Before 2020 she had these spells daily.  Last spell was 2021.    Caregiver denies dizziness, no double vision, no nausea, no vomiting, no abdominal pain, no mood changes, no ER visits, no hospitalizations.  She has been irritable and defiant. On this visit we spoke about patient's seizures, antiepileptic drug, and plan of epilepsy are. Patient/caregiver was agreeable with plan of care.     Current antiepileptic " "drug:   Levetiracetam 500 mg morning and 1000 mg evening     Past antiepileptic drug-   Lamotrigine caused rash - 7/2020 she developed lamotrigine induced maculopapular rash in sun exposed areas (face/neck/forearm) with itching, no bleeding or ulceration noted. After stopping lamotrigine her rash resolved.     Oxcarbazepine cause severe mood issues in 12/2021 150 mg morning-300 mg twice a day: \"aggression, defiant, feisty, hallucinating, mean, more flashbacks, accusing housemates, hitting house mate, very agitated\".    Exam:   Alert, orientated, speech is fluent, face is symmetric, extra-ocular movement in tact, no focal deficits noted. Nails were painted blue (she enjoys fun nail colors).       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  Last focal seizures with impairment in awareness  8/2021. In regards to antiseizure medication, she is very sensitive to antiepileptic drug she had lamotrigine induced sun sensitivity rash rarely and aggression with oxcarbazepine  (very sensitive to antiepileptic drugs). We have not been able to transition her to another antiepileptic drug. We will continue levetiracetam 500-1000 (most seizure are early morning) It will be helpful to improve glycemic control and we will continue 500-1000. If she has seizure we can increase 750-1000. Patient and caregiver are agreeable to this plan of care.    Plan:   Continue levetiracetam 500 mg morning and 1000 mg " "night (if she has seizure we can increase levetiracetam 750 mg morning and 1000 mg pm). If she has side effects on levetiracetam then we may consider vimpat.     Monitor seizure     Nephrologist to manage Na+     Neuropsychology for memory deficits     Follow up  3 months     I spent 19 minutes for patient's medical care. During this time key medical decisions were made with review of medical chart prior to visit, visit with patient, counseling/education, and post visit work, including documentation on the day of visit. I addressed all questions the patient/caregiver raised in regards to the patient's medical care. This note was created with voice recognition software. Inadvertent grammatical errors, typographical errors, and \"sound a like\" substitutions may occur due to limitations of the software.  Read the note carefully and apply context when erroneous substitutions have occurred. Thank you.     Deneen Frazier MD                     "

## 2022-01-14 NOTE — LETTER
Date:January 15, 2022      Patient was self referred, no letter generated. Do not send.        [facility] Health Information

## 2022-01-14 NOTE — LETTER
"2022       RE: Kindra Holt  : 1945   MRN: [id number]      Dear Colleague,    Thank you for referring your patient, Kindra Holt, to the [facility]PILE[facility] at [facility]. Please see a copy of my visit note below.    Kindra is a 76 year old who is being evaluated via a billable video visit.      How would you like to obtain your AVS? Mail a copy  If the video visit is dropped, the invitation should be resent by: Send to e-mail at: [email or web address]  Will anyone else be joining your video visit? Yes: staff. How would they like to receive their invitation? Send to e-mail at: [email or web address]      Video Start Time: 3:02PM  Video-Visit Details    Type of service:  Video Visit    Video End Time:3:30PM    Originating Location (pt. Location): Home    Distant Location (provider location):  [facility]     Platform used for Video Visit: [facility] Ep[facility] Progress Note    Patient:  Kindra Holt  :  1945   Age:  76 year old   Today's Visit:  2022    HPI: Joined with staff member gurvinder. We started oxcarbazepine and she is not tolerating this, she is more  \"aggression, defiant, feisty, hallucinating, mean, more flashbacks, accusing housemates, hitting house mate, very agitated\".  They stopped oxcarbazepine yesterday. In the past she had more mood issues with higher BG on levetiracetam monotherapy. She did not start vimpat. Caregivers would like to leave her on levetiracetam monotherapy.     Spell Type 1: These are described as  \"odd spells, vomited several times, dud not respond, eyes were closed, increased drooling, very confused, did not follow commands, she is not able remember how to turn on faucet, speech slurred, stiffens\".  No clear warning sign. Before 2020 she had these spells daily.  Last spell was 2021.    Caregiver denies dizziness, no double vision, no nausea, no " "vomiting, no abdominal pain, no mood changes, no ER visits, no hospitalizations.  She has been irritable and defiant. On this visit we spoke about patient's seizures, antiepileptic drug, and plan of epilepsy are. Patient/caregiver was agreeable with plan of care.     Current antiepileptic drug:   Levetiracetam 500 mg morning and 1000 mg evening     Past antiepileptic drug-   Lamotrigine caused rash - 7/2020 she developed lamotrigine induced maculopapular rash in sun exposed areas (face/neck/forearm) with itching, no bleeding or ulceration noted. After stopping lamotrigine her rash resolved.     Oxcarbazepine cause severe mood issues in 12/2021 150 mg morning-300 mg twice a day: \"aggression, defiant, feisty, hallucinating, mean, more flashbacks, accusing housemates, hitting house mate, very agitated\".    Exam:   Alert, orientated, speech is fluent, face is symmetric, extra-ocular movement in tact, no focal deficits noted. Nails were painted blue (she enjoys fun nail colors).       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  Last focal seizures with impairment in awareness  8/2021. In regards to antiseizure medication, she is very sensitive to antiepileptic drug she had lamotrigine induced sun sensitivity rash rarely and aggression with oxcarbazepine  (very sensitive to antiepileptic drugs). We have not been able to transition her to another antiepileptic drug. We will continue " "levetiracetam 500-1000 (most seizure are early morning) It will be helpful to improve glycemic control and we will continue 500-1000. If she has seizure we can increase 750-1000. Patient and caregiver are agreeable to this plan of care.    Plan:   Continue levetiracetam 500 mg morning and 1000 mg night (if she has seizure we can increase levetiracetam 750 mg morning and 1000 mg pm). If she has side effects on levetiracetam then we may consider vimpat.     Monitor seizure     Nephrologist to manage Na+     Neuropsychology for memory deficits     Follow up  3 months     I spent 19 minutes for patient's medical care. During this time key medical decisions were made with review of medical chart prior to visit, visit with patient, counseling/education, and post visit work, including documentation on the day of visit. I addressed all questions the patient/caregiver raised in regards to the patient's medical care. This note was created with voice recognition software. Inadvertent grammatical errors, typographical errors, and \"sound a like\" substitutions may occur due to limitations of the software.  Read the note carefully and apply context when erroneous substitutions have occurred. Thank you.     Deneen Frazier MD                         Again, thank you for allowing me to participate in the care of your patient.      Sincerely,    Deneen Frazier MD      "

## 2022-01-14 NOTE — PATIENT INSTRUCTIONS
Continue levetiracetam 500 mg morning and 1000 mg night (if she has seizure we can increase levetiracetam 750 mg morning and 1000 mg pm).    Monitor for seizures     Nephrologist to manage Na+     Neuropsychology for memory deficits     Follow up  3 months     Deneen Frazier MD

## 2022-01-30 ENCOUNTER — HEALTH MAINTENANCE LETTER (OUTPATIENT)
Age: 77
End: 2022-01-30

## 2022-02-04 ENCOUNTER — APPOINTMENT (OUTPATIENT)
Dept: URBAN - METROPOLITAN AREA CLINIC 257 | Age: 77
Setting detail: DERMATOLOGY
End: 2022-02-04

## 2022-02-04 DIAGNOSIS — Z71.89 OTHER SPECIFIED COUNSELING: ICD-10-CM

## 2022-02-04 DIAGNOSIS — L72.11 PILAR CYST: ICD-10-CM

## 2022-02-04 DIAGNOSIS — D18.0 HEMANGIOMA: ICD-10-CM

## 2022-02-04 DIAGNOSIS — D22 MELANOCYTIC NEVI: ICD-10-CM

## 2022-02-04 DIAGNOSIS — L81.4 OTHER MELANIN HYPERPIGMENTATION: ICD-10-CM

## 2022-02-04 DIAGNOSIS — L82.1 OTHER SEBORRHEIC KERATOSIS: ICD-10-CM

## 2022-02-04 DIAGNOSIS — L57.8 OTHER SKIN CHANGES DUE TO CHRONIC EXPOSURE TO NONIONIZING RADIATION: ICD-10-CM

## 2022-02-04 DIAGNOSIS — L57.0 ACTINIC KERATOSIS: ICD-10-CM

## 2022-02-04 PROBLEM — D18.01 HEMANGIOMA OF SKIN AND SUBCUTANEOUS TISSUE: Status: ACTIVE | Noted: 2022-02-04

## 2022-02-04 PROBLEM — D22.5 MELANOCYTIC NEVI OF TRUNK: Status: ACTIVE | Noted: 2022-02-04

## 2022-02-04 PROCEDURE — OTHER LIQUID NITROGEN: OTHER

## 2022-02-04 PROCEDURE — OTHER MIPS QUALITY: OTHER

## 2022-02-04 PROCEDURE — 17003 DESTRUCT PREMALG LES 2-14: CPT

## 2022-02-04 PROCEDURE — 99213 OFFICE O/P EST LOW 20 MIN: CPT | Mod: 25

## 2022-02-04 PROCEDURE — OTHER COUNSELING: OTHER

## 2022-02-04 PROCEDURE — 17000 DESTRUCT PREMALG LESION: CPT

## 2022-02-04 ASSESSMENT — LOCATION SIMPLE DESCRIPTION DERM
LOCATION SIMPLE: RIGHT LOWER BACK
LOCATION SIMPLE: UPPER BACK
LOCATION SIMPLE: LEFT FOREHEAD
LOCATION SIMPLE: RIGHT CHEEK
LOCATION SIMPLE: RIGHT SCALP

## 2022-02-04 ASSESSMENT — LOCATION ZONE DERM
LOCATION ZONE: SCALP
LOCATION ZONE: FACE
LOCATION ZONE: TRUNK

## 2022-02-04 ASSESSMENT — LOCATION DETAILED DESCRIPTION DERM
LOCATION DETAILED: RIGHT CENTRAL FRONTAL SCALP
LOCATION DETAILED: RIGHT SUPERIOR MEDIAL MIDBACK
LOCATION DETAILED: LEFT SUPERIOR MEDIAL FOREHEAD
LOCATION DETAILED: RIGHT LATERAL MALAR CHEEK
LOCATION DETAILED: INFERIOR THORACIC SPINE

## 2022-02-04 NOTE — PROCEDURE: LIQUID NITROGEN
Consent: The patient's consent was obtained including but not limited to risks of crusting, scabbing, blistering, scarring, darker or lighter pigmentary change, recurrence, incomplete removal and infection.
Render In Bullet Format When Appropriate: No
Post-Care Instructions: I reviewed with the patient in detail post-care instructions. Patient is to wear sunprotection, and avoid picking at any of the treated lesions. Pt may apply Vaseline to crusted or scabbing areas.
Detail Level: Zone
Render Post-Care Instructions In Note?: yes
Total Number Of Aks Treated: 8
Duration Of Freeze Thaw-Cycle (Seconds): 0

## 2022-02-04 NOTE — PROCEDURE: MIPS QUALITY
Quality 110: Preventive Care And Screening: Influenza Immunization: Influenza Immunization Ordered or Recommended, but not Administered due to system reason
Quality 130: Documentation Of Current Medications In The Medical Record: Current Medications Documented
Detail Level: Detailed
Quality 431: Preventive Care And Screening: Unhealthy Alcohol Use - Screening: Patient screened for unhealthy alcohol use using a single question and scores less than 2 times per year
Quality 226: Preventive Care And Screening: Tobacco Use: Screening And Cessation Intervention: Patient screened for tobacco use and is an ex/non-smoker
Quality 111:Pneumonia Vaccination Status For Older Adults: Pneumococcal Vaccination not Administered or Previously Received, Reason not Otherwise Specified

## 2022-03-09 ENCOUNTER — OFFICE VISIT (OUTPATIENT)
Dept: NEUROLOGY | Facility: CLINIC | Age: 77
End: 2022-03-09
Attending: PSYCHIATRY & NEUROLOGY
Payer: COMMERCIAL

## 2022-03-09 DIAGNOSIS — F06.8 OTHER SPECIFIED MENTAL DISORDERS DUE TO KNOWN PHYSIOLOGICAL CONDITION: ICD-10-CM

## 2022-03-09 DIAGNOSIS — R41.89 COGNITIVE DEFICIT WITH IMPAIRED VISUOSPATIAL FUNCTION: ICD-10-CM

## 2022-03-09 DIAGNOSIS — F70 MILD INTELLECTUAL DISABILITIES: Primary | ICD-10-CM

## 2022-03-09 DIAGNOSIS — R41.842 COGNITIVE DEFICIT WITH IMPAIRED VISUOSPATIAL FUNCTION: ICD-10-CM

## 2022-03-09 DIAGNOSIS — R41.3 MEMORY DEFICITS: ICD-10-CM

## 2022-03-09 NOTE — LETTER
"3/9/2022     RE: Kindra Holt  : 1945   MRN: [id number]      Dear Colleague,    Thank you for referring your patient, Kindra Holt, to the [facility] EPILEPSY CARE at [facility]. Please see a copy of my visit note below.    Patient was seen for neuropsychological evaluation at the request of Dr. Deneen Frazier, for the purposes of diagnostic clarification and treatment planning.  1 hrs 57 min of test administration and scoring were provided by this writer, Angelica Galvez.  Please see Dr. Rajat Hunt's report for a full interpretation of the findings.      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 She was accompanied to the evaluation by the manager from her group home, Alexsandra.      BACKGROUND INFORMATION / INTERVIEW FINDINGS    Records indicate that Ms. Holt began suffering from seizures at age 65.  Her seizures are reportedly characterized by an unusual sensation, vomiting, unresponsiveness, eyes closing, drooling, confusion, slurred speech, and stiffening.  She has generalized tonic-clonic seizures.  The seizures were occurring daily, but apparently have reduced in frequency.  An MRI of her brain on 2020 documented \"1.  No acute intracranial abnormality. 2.  Normal brain parenchymal morphology.  Few scattered tiny foci of T2 signal within the white matter consistent with chronic small vessel ischemic changes. 3.  No acute or chronic intracranial hemorrhage.\"  EEG studies in , and over 48 hours in  were both read as normal.  Her other medical history includes chronic kidney disease, diabetes mellitus, mild intellectual disability, and suspected mild cognitive impairment.  There is also " question of a past TIA.  Concerns have been expressed about her cognition.  The current evaluation was requested by Dr. Deneen Frazier, in this context.    On interview, Ms. Holt confirmed the above history.  A good portion of the background information that was acquired in the interview came from the patient's , Alexsandra.  Alexsandra reported that she has known the patient for 3 years.  Alexsandra indicated that when the patient's seizures first occur, it looks like the patient is having a stroke.  She reported that the patient's seizures all appear the same.  She reported that the patient becomes confused, and then does not act like herself.  She begins slurring her speech and subsequently vomits.  She then loses awareness.  She stated that the patient's muscles lose tone, and she has sometimes fallen.  She has had falls where she has struck her head with the seizures.  Alexsandra reported that the patient has had approximately 5 major seizures over the last 3 years.  She stated that the last of these seizures occurred 1 week prior to this appointment.  She noted that they occur every few months on average.  She stated that the patient also has staring spells that occur 2 or 3 times per week, but she noted that the patient will reorient if she is spoken to.  Alexsandra stated that the patient's seizures are triggered by heat, dehydration, or if she has had a urinary tract infection.    We next discussed the patient's development.  Ms. Holt reported her belief that her development was normal.  Alexsandra disagreed, and stated that it is her understanding that the patient's development was delayed, and this was immediately apparent after her birth.  She stated that she does not have additional details.  The patient reported that she started  on time.  She indicated that she was in mainstream classes in school.  She reported that she finished the eighth grade, and was never held back.  When asked about her  "reasoning for not going to high school, she stated that she was too nervous.  From what I was able to gather, it sounds like there was uncertainty about how she spent the next several years of her life.  It [place], and her parents took care of her.  Her parents were her legal guardians.  After her parents passed away, her sister was her legal guardian, and then eventually her brother was her legal guardian.  She reportedly lived semi-independently, but had the support of a PCA.  A few years ago, she moved into her current group home setting.  This is when Alexsandra first met the patient.  Alexsandra indicated that since she has known the patient, there has been a gradual decline in the patient's thinking in a somewhat stairstep manner.  She noted some degree of fluctuation in the patient's thinking as well.  Specifically, she indicated that the patient has trouble remembering words.  She also noted that sometimes when the patient talks, it may not make sense.  She reported that the patient also has forgetting.  For example, she may forget events.  She also noted fluctuations in the patient's thinking, and speculated that these fluctuations are due to the patient's diabetes.  She reported that the patient is \"more off\" when her blood sugar is elevated.  Additionally, she noted that the patient is grumpy when these issues arise.  She stated that the patient gets frustrated.  Alexsandra also noted that the patient will forget daily tasks, and will later state that she has completed these tasks. She noted that there are times when the patient is quite confused.  She stated that the patient's confusion increases prior to her having a seizure.  When asked about personality changes, Alexsandra stated that the patient is grumpier than she used to be.  The patient herself acknowledged that she becomes frustrated.  She denied having identified cognitive changes, even when presented with a list of specific " "cognitive domains.    With respect to mental health, Ms. Holt reported that her mood is good.  She denied having any prior mental health diagnoses.  Alexsandra reported that the patient has been seeing a therapist.  She stated that the sessions were occurring monthly, but are now occurring on a quarterly basis.  The patient denied ever having had a psychiatric hospitalization.  When asked about hallucinations, the patient initially denied these symptoms.  However, Alexsandra reported that the patient reportedly has visual and auditory hallucinations of \"Jerod\" that she sees at times.  The patient denied suicidal ideation or past suicide attempts.    With regard to other medical background, Ms. Holt denied a tremor.  She denied troubles with balance or gait.  They stated that she walks quickly.  They denied falls.  They denied symptoms consistent with festinating gait.  The patient denied prior head injury.  There is some question about whether she had a stroke in the past.  She reported that her sleep is good, and that she typically sleeps about 10 or 11 hours per night.  She slept normally the night before the exam.  Both the patient and Alexsandra denied that the patient has symptoms consistent with REM behavior disorder.  Per records, her current medications include aspirin, cholecalciferol, diphenhydramine, dulaglutide, furosemide, ketoconazole cream, levetiracetam, levothyroxine, lisinopril, Metformin, multivitamin, simethicone, simvastatin, sitagliptin, and hydrocortisone cream.  She denied alcohol, tobacco, or illicit drug use.  She denied past problematic substance use.    With regard to family neurologic history, the patient reported that her father, mother, and sister had brain tumors.    As alluded to above, Ms. Holt has been living in a group home for the last 3 years.  She apparently lived in independent apartments in the past.  She is currently receiving assistance with bathing and brushing her " teeth.  She otherwise manages her own basic daily activities on her own.  The group home is managing her medications, her finances, and her meal preparation.  She has never driven.  The patient's niece is now her legal guardian.    By way of background, Ms. Holt has never been  and does not have children.  Her education is as described above.  She finished the eighth grade.  Professionally, from what I was able to gather, she has always worked in supported employment roles with a .  She has done piece work, as well as cleaning work.  She currently attends a day program, and does not have an income.    BEHAVIORAL OBSERVATIONS  Ms. Holt was polite and cooperative with the exam. Her speech was notable for dysarthria, dysarticulation, moderate difficulties with word finding, and moderate dysfluency.  Her comprehension was impaired.  Her thought processes were notable for moderate distractibility, mild variability in motivation, forgetting, mild carelessness, and moderate slowing.  Her insight was impaired, as evidenced by her lack of appreciation of her cognitive deficits.  She engaged in limited spontaneous conversation with the examiner.  She had reduced confidence in her ability on testing.  Her mood was mildly depressed with congruent affect.  Her effort was good. The current results are felt to be an accurate depiction of her cognitive functioning.      RESULTS OF EXAM  Her performances on standardized measures of neuropsychological functioning were as follows:    She was disoriented to the name of the clinic, but was otherwise oriented to place.  She was oriented to time and various aspects of personal information.  Performance on a measure of single word reading was performed in the impaired range.  Auditory attention for digits was impaired.  Learning of words in a list format was impaired.  Delayed recall of list words was impaired, as she was unable to recollect any of the list  "words.  Percent retention of list words was impaired.  Delayed recognition of list words was impaired.  She had an \"yes\" response bias, and responded yes to all of the recognition items.  Learning, delayed recall, and percent retention of simple geometric shapes and their spatial locations were all impaired.  She was unable to accurately reproduce any of the shapes.  Delayed recognition of the shapes was impaired, again with a \"yes\" response bias.  Visual perceptual matching of faces was borderline impaired.  Visual problem-solving with blocks was impaired.  Her drawing of a complicated geometric figure was very severely impaired, and notable for only elements of the figure redrawn.  Comprehension of phrases and short stories was impaired.  She again had a \"yes\" response bias, and responded yes to all items on this measure.  Verbal associative fluency was impaired.  Animal fluency was average.  Naming to confrontation was impaired.  Verbal abstract reasoning was borderline impaired.  Speeded visual sequencing under focused attention was low average, but with 3 errors.  A similar measure with a divided attention component was impaired, and discontinued.  Measures of speeded word reading, speeded color naming, and speeded inhibition of an overlearned response were all impaired.  She obtained a low (poor) score on a measure of real world decision making and problem-solving.  Speeded visual motor coding was borderline impaired.    She endorsed items consistent with minimal symptoms of depression, and minimal symptoms of anxiety on self-report measures.    IMPRESSIONS  Ms. Holt demonstrated deficits that are consistent with near global brain dysfunction and her history of mild intellectual disability.  Within that context, there is suggestion of selectively severe dysfunction of bilateral mesial temporal, frontal, and right hemispheric brain networks.  It is challenging to be certain about her cognitive baseline, " "given the lack of history.  However, I do suspect that there has been a decline, based on the provided background information and the severity of some of her cognitive deficits.  Speculatively, the right hemispheric dysfunction could be attributable to her seizure onset zone. She could also have bilateral mesial temporal seizure onsets.  Additionally, I cannot rule out the possibility of an underlying neurodegenerative process.  In this evaluation, she has pronounced dysfunction of learning, memory, nearly all visually mediated processing, and executive functioning abilities.  Her insight is also impaired.  Other cognitive abilities were performed in keeping with her mild intellectual disability.  She has strengths in orientation, semantic fluency, some aspects of cognitive speed.  She is not reporting elevated symptoms of depression or anxiety.    RECOMMENDATIONS  Preliminary results and recommendations were provided to the patient and her , Alexsandra, on the date of the evaluation, and all questions were answered.     1.  Ms. Holt will continue to require full-time support and supervision of her daily activities.  Her current living situation is appropriate at this time.    2.  She is not capable of independently making important decisions.  It is appropriate that she has a guardian established.    3.  She demonstrated to a \"yes\" response bias.  In order to accurately know what her preferences, it will be important to use alternating yes/no questions.    4.  Her lack of insight belies the severity of her cognitive deficits.  Those caring for the patient should be aware of her deficits and take steps to ensure that she receives appropriate support.    5. Follow-up neuropsychological evaluation is recommended in 1 year in order to assess and update recommendations as appropriate.  The current results can be used as a baseline at that time.    Rajat Hunt, Ph.D., L.P., ABPP  Board Certified in " Clinical Neuropsychology   / Licensed Psychologist [id number]    Time spent: One unit (50 minutes) psychiatric diagnostic interview including interview, clinical assessment of thinking, reasoning, and judgment by licensed and board-certified neuropsychologist (CPT 56579). One unit (60 minutes) neuropsychological testing evaluation by licensed and board-certified neuropsychologist, including integration of patient data, interpretation of standardized test results and clinical data, clinical decision-making, treatment planning, report, and interactive feedback to the patient, first hour (CPT 29895). One units (45 minutes) of neuropsychological testing evaluation by licensed and board-certified neuropsychologist, including integration of patient data, interpretation of standardized test results and clinical data, clinical decision-making, consulting with colleagues, treatment planning, report, and interactive feedback to the patient, subsequent hours (CPT 17083). One unit (30 minutes) of psychological and neuropsychological test administration and scoring by technician, first 30 minutes (CPT 98615). Three units (87 minutes) psychological or neuropsychological test administration and scoring by technician, subsequent 30 minutes (CPT 07341). Diagnoses: F70, R41.3, R41.842, F06.8.

## 2022-03-09 NOTE — PROGRESS NOTES
"Name: Kindra Holt  MR#: [id number]  YOB: 1945  Date of Exam: Mar 9, 2022    NEUROPSYCHOLOGICAL EVALUATION    IDENTIFYING INFORMATION  Kindra Holt is a 76 year old year old, right handed, supported day program worker, with 8 years of formal education. She was accompanied to the evaluation by the manager from her group home, Alexsandra.      BACKGROUND INFORMATION / INTERVIEW FINDINGS    Records indicate that Ms. Holt began suffering from seizures at age 65.  Her seizures are reportedly characterized by an unusual sensation, vomiting, unresponsiveness, eyes closing, drooling, confusion, slurred speech, and stiffening.  She has generalized tonic-clonic seizures.  The seizures were occurring daily, but apparently have reduced in frequency.  An MRI of her brain on January 16, 2020 documented \"1.  No acute intracranial abnormality. 2.  Normal brain parenchymal morphology.  Few scattered tiny foci of T2 signal within the white matter consistent with chronic small vessel ischemic changes. 3.  No acute or chronic intracranial hemorrhage.\"  EEG studies in June, 2020, and over 48 hours in October, 2021 were both read as normal.  Her other medical history includes chronic kidney disease, diabetes mellitus, mild intellectual disability, and suspected mild cognitive impairment.  There is also question of a past TIA.  Concerns have been expressed about her cognition.  The current evaluation was requested by Dr. Deneen Frazier, in this context.    On interview, Ms. Holt confirmed the above history.  A good portion of the background information that was acquired in the interview came from the patient's , Alexsandra.  Alexsandra reported that she has known the patient for 3 years.  Alexsandra indicated that when the patient's seizures first occur, it looks like the patient is having a stroke.  She reported that the patient's seizures all appear the same.  She reported that the patient becomes " confused, and then does not act like herself.  She begins slurring her speech and subsequently vomits.  She then loses awareness.  She stated that the patient's muscles lose tone, and she has sometimes fallen.  She has had falls where she has struck her head with the seizures.  Alexsandra reported that the patient has had approximately 5 major seizures over the last 3 years.  She stated that the last of these seizures occurred 1 week prior to this appointment.  She noted that they occur every few months on average.  She stated that the patient also has staring spells that occur 2 or 3 times per week, but she noted that the patient will reorient if she is spoken to.  Alexsandra stated that the patient's seizures are triggered by heat, dehydration, or if she has had a urinary tract infection.    We next discussed the patient's development.  Ms. Holt reported her belief that her development was normal.  Alexsandra disagreed, and stated that it is her understanding that the patient's development was delayed, and this was immediately apparent after her birth.  She stated that she does not have additional details.  The patient reported that she started  on time.  She indicated that she was in mainstream classes in school.  She reported that she finished the eighth grade, and was never held back.  When asked about her reasoning for not going to high school, she stated that she was too nervous.  From what I was able to gather, it sounds like there was uncertainty about how she spent the next several years of her life.  It [place], and her parents took care of her.  Her parents were her legal guardians.  After her parents passed away, her sister was her legal guardian, and then eventually her brother was her legal guardian.  She reportedly lived semi-independently, but had the support of a PCA.  A few years ago, she moved into her current group home setting.  This is when Alexsandra first met  "the patient.  Alexsandra indicated that since she has known the patient, there has been a gradual decline in the patient's thinking in a somewhat stairstep manner.  She noted some degree of fluctuation in the patient's thinking as well.  Specifically, she indicated that the patient has trouble remembering words.  She also noted that sometimes when the patient talks, it may not make sense.  She reported that the patient also has forgetting.  For example, she may forget events.  She also noted fluctuations in the patient's thinking, and speculated that these fluctuations are due to the patient's diabetes.  She reported that the patient is \"more off\" when her blood sugar is elevated.  Additionally, she noted that the patient is grumpy when these issues arise.  She stated that the patient gets frustrated.  Alexsandra also noted that the patient will forget daily tasks, and will later state that she has completed these tasks. She noted that there are times when the patient is quite confused.  She stated that the patient's confusion increases prior to her having a seizure.  When asked about personality changes, Alexsandra stated that the patient is grumpier than she used to be.  The patient herself acknowledged that she becomes frustrated.  She denied having identified cognitive changes, even when presented with a list of specific cognitive domains.    With respect to mental health, Ms. Holt reported that her mood is good.  She denied having any prior mental health diagnoses.  Alexsandra reported that the patient has been seeing a therapist.  She stated that the sessions were occurring monthly, but are now occurring on a quarterly basis.  The patient denied ever having had a psychiatric hospitalization.  When asked about hallucinations, the patient initially denied these symptoms.  However, Alexsandra reported that the patient reportedly has visual and auditory hallucinations of \"Jerod\" that she sees at times.  The patient denied suicidal " ideation or past suicide attempts.    With regard to other medical background, Ms. Holt denied a tremor.  She denied troubles with balance or gait.  They stated that she walks quickly.  They denied falls.  They denied symptoms consistent with festinating gait.  The patient denied prior head injury.  There is some question about whether she had a stroke in the past.  She reported that her sleep is good, and that she typically sleeps about 10 or 11 hours per night.  She slept normally the night before the exam.  Both the patient and Alexsandra denied that the patient has symptoms consistent with REM behavior disorder.  Per records, her current medications include aspirin, cholecalciferol, diphenhydramine, dulaglutide, furosemide, ketoconazole cream, levetiracetam, levothyroxine, lisinopril, Metformin, multivitamin, simethicone, simvastatin, sitagliptin, and hydrocortisone cream.  She denied alcohol, tobacco, or illicit drug use.  She denied past problematic substance use.    With regard to family neurologic history, the patient reported that her father, mother, and sister had brain tumors.    As alluded to above, Ms. Holt has been living in a group home for the last 3 years.  She apparently lived in independent apartments in the past.  She is currently receiving assistance with bathing and brushing her teeth.  She otherwise manages her own basic daily activities on her own.  The group home is managing her medications, her finances, and her meal preparation.  She has never driven.  The patient's niece is now her legal guardian.    By way of background, Ms. Holt has never been  and does not have children.  Her education is as described above.  She finished the eighth grade.  Professionally, from what I was able to gather, she has always worked in supported employment roles with a .  She has done piece work, as well as cleaning work.  She currently attends a day program, and does not have an  "income.    BEHAVIORAL OBSERVATIONS  Ms. Holt was polite and cooperative with the exam. Her speech was notable for dysarthria, dysarticulation, moderate difficulties with word finding, and moderate dysfluency.  Her comprehension was impaired.  Her thought processes were notable for moderate distractibility, mild variability in motivation, forgetting, mild carelessness, and moderate slowing.  Her insight was impaired, as evidenced by her lack of appreciation of her cognitive deficits.  She engaged in limited spontaneous conversation with the examiner.  She had reduced confidence in her ability on testing.  Her mood was mildly depressed with congruent affect.  Her effort was good. The current results are felt to be an accurate depiction of her cognitive functioning.      RESULTS OF EXAM  Her performances on standardized measures of neuropsychological functioning were as follows:    She was disoriented to the name of the clinic, but was otherwise oriented to place.  She was oriented to time and various aspects of personal information.  Performance on a measure of single word reading was performed in the impaired range.  Auditory attention for digits was impaired.  Learning of words in a list format was impaired.  Delayed recall of list words was impaired, as she was unable to recollect any of the list words.  Percent retention of list words was impaired.  Delayed recognition of list words was impaired.  She had an \"yes\" response bias, and responded yes to all of the recognition items.  Learning, delayed recall, and percent retention of simple geometric shapes and their spatial locations were all impaired.  She was unable to accurately reproduce any of the shapes.  Delayed recognition of the shapes was impaired, again with a \"yes\" response bias.  Visual perceptual matching of faces was borderline impaired.  Visual problem-solving with blocks was impaired.  Her drawing of a complicated geometric figure was very " "severely impaired, and notable for only elements of the figure redrawn.  Comprehension of phrases and short stories was impaired.  She again had a \"yes\" response bias, and responded yes to all items on this measure.  Verbal associative fluency was impaired.  Animal fluency was average.  Naming to confrontation was impaired.  Verbal abstract reasoning was borderline impaired.  Speeded visual sequencing under focused attention was low average, but with 3 errors.  A similar measure with a divided attention component was impaired, and discontinued.  Measures of speeded word reading, speeded color naming, and speeded inhibition of an overlearned response were all impaired.  She obtained a low (poor) score on a measure of real world decision making and problem-solving.  Speeded visual motor coding was borderline impaired.    She endorsed items consistent with minimal symptoms of depression, and minimal symptoms of anxiety on self-report measures.    IMPRESSIONS  Ms. Holt demonstrated deficits that are consistent with near global brain dysfunction and her history of mild intellectual disability.  Within that context, there is suggestion of selectively severe dysfunction of bilateral mesial temporal, frontal, and right hemispheric brain networks.  It is challenging to be certain about her cognitive baseline, given the lack of history.  However, I do suspect that there has been a decline, based on the provided background information and the severity of some of her cognitive deficits.  Speculatively, the right hemispheric dysfunction could be attributable to her seizure onset zone. She could also have bilateral mesial temporal seizure onsets.  Additionally, I cannot rule out the possibility of an underlying neurodegenerative process.  In this evaluation, she has pronounced dysfunction of learning, memory, nearly all visually mediated processing, and executive functioning abilities.  Her insight is also impaired.  Other " "cognitive abilities were performed in keeping with her mild intellectual disability.  She has strengths in orientation, semantic fluency, some aspects of cognitive speed.  She is not reporting elevated symptoms of depression or anxiety.    RECOMMENDATIONS  Preliminary results and recommendations were provided to the patient and her , Alexsandra, on the date of the evaluation, and all questions were answered.     1.  Ms. Holt will continue to require full-time support and supervision of her daily activities.  Her current living situation is appropriate at this time.    2.  She is not capable of independently making important decisions.  It is appropriate that she has a guardian established.    3.  She demonstrated to a \"yes\" response bias.  In order to accurately know what her preferences, it will be important to use alternating yes/no questions.    4.  Her lack of insight belies the severity of her cognitive deficits.  Those caring for the patient should be aware of her deficits and take steps to ensure that she receives appropriate support.    5. Follow-up neuropsychological evaluation is recommended in 1 year in order to assess and update recommendations as appropriate.  The current results can be used as a baseline at that time.    Rajat Hunt, Ph.D., L.P., ABPP  Board Certified in Clinical Neuropsychology   / Licensed Psychologist [id number]    Time spent: One unit (50 minutes) psychiatric diagnostic interview including interview, clinical assessment of thinking, reasoning, and judgment by licensed and board-certified neuropsychologist (CPT 12082). One unit (60 minutes) neuropsychological testing evaluation by licensed and board-certified neuropsychologist, including integration of patient data, interpretation of standardized test results and clinical data, clinical decision-making, treatment planning, report, and interactive feedback to the patient, first hour (CPT 35707). " One units (45 minutes) of neuropsychological testing evaluation by licensed and board-certified neuropsychologist, including integration of patient data, interpretation of standardized test results and clinical data, clinical decision-making, consulting with colleagues, treatment planning, report, and interactive feedback to the patient, subsequent hours (CPT 09112). One unit (30 minutes) of psychological and neuropsychological test administration and scoring by technician, first 30 minutes (CPT 78187). Three units (87 minutes) psychological or neuropsychological test administration and scoring by technician, subsequent 30 minutes (CPT 54661). Diagnoses: F70, R41.3, R41.842, F06.8.

## 2022-03-09 NOTE — PROGRESS NOTES
Patient was seen for neuropsychological evaluation at the request of Dr. Deneen Frazier, for the purposes of diagnostic clarification and treatment planning.  1 hrs 57 min of test administration and scoring were provided by this writer, Angelica Galvez.  Please see Dr. Rajat Hunt's report for a full interpretation of the findings.

## 2022-03-10 NOTE — PROGRESS NOTES
Name: Kindra Holt MRN: [id number]  : 1945 AGGARWAL: 3/9/2022  Staff: ROSA M Tech: LUZ MARINA Age: 76  Sex: Female Hand: Right Educ: <9  Vision: 20/30 ?with correction / ?without correction    ORIENTATION     Time  -0     Place       Personal info            WAIS-IV     Raw SSa     Similarities  10 5     Block Design  6 3     Digit Span  10 2      Coding  18 5    ANNALISE-O    Raw  T %ile     Copy    3.5     ?1     Time to Copy  163        WRAT5   SS      Word Reading  66     COWAT (FAS)   Raw: 4   T: 22    Animals   Raw:  14  T-score:  46    BOSTON NAMING TEST-15   Raw: 4 /15 Z-score: -4.33    COMPLEX IDEATIONAL MATERIAL   Raw: 0/12 T: 11    TRAILS  Raw  Err %ile    A 98  3 14   B Unable       STROOP Raw %ile   Word 42 <14   Color  38 <14       Color/Word  7 <14    CURRAN FACIAL RECOGNITION   Raw: 36   Interp: Borderline (with corrections)    PHQ9  Raw:  1   Interpretation: Minimal    GAD7  Raw:  1  Interpretation: Minimal    ILS Health and Safety Questionnaire     Total: 16 Classification: Low    HVLT-R     Trial 1 2 3 Total      2 2 3  7  Raw T     Total Learning (1-3) 7 ?20     Delayed Recall  0 ?20     Percent Retention 0 ?20      Recognition Hits/FP 12/12 ?20    BVMT-R     Trial 1 2 3 Total      0 0 0  0    Raw T / %ile     Total Learning (1-3) 0 <20     Delayed Recall  0 <20     Percent Retention 0 <1st     Recognition Hits/FP 6/6 <1st

## 2022-04-15 ENCOUNTER — VIRTUAL VISIT (OUTPATIENT)
Dept: NEUROLOGY | Facility: CLINIC | Age: 77
End: 2022-04-15
Payer: COMMERCIAL

## 2022-04-15 DIAGNOSIS — R41.3 MEMORY DEFICITS: Primary | ICD-10-CM

## 2022-04-15 NOTE — PATIENT INSTRUCTIONS
Continue levetiracetam 500 mg morning and 1000 mg night (if she has seizure we can increase levetiracetam 750 mg morning and 1000 mg pm). If she has side effects on levetiracetam then we may consider vimpat.     Monitor for seizures     If she has more seizure we can increase levetiracetam     Memory clinic consult (consider LBD, she has progressive memory decline, progressive decline with ADL). She has seizure infrequently, and seizure or medications would not account for current memory issues.     Follow up  3 months     Deneen Frazier MD

## 2022-04-15 NOTE — LETTER
"4/15/2022     RE: Kindra Holt  : 1945   MRN: [id number]      Dear Colleague,    Thank you for referring your patient, Kindra Holt, to the [facility] at [facility]. Please see a copy of my visit note below.    Kindra is a 76 year old who is being evaluated via a billable video visit.      How would you like to obtain your AVS? Mail a copy, fax to [phone]  If the video visit is dropped, the invitation should be resent by: Send to e-mail at: [email or web address]  Will anyone else be joining your video visit? Yes: staff. How would they like to receive their invitation? Send to e-mail at: [email or web address]      Video Start Time: 3:33 PM  Video-Visit Details    Type of service:  Video Visit    Video End Time:3:55 pm     Originating Location (pt. Location): Home    Distant Location (provider location):  [facility]     Platform used for Video Visit: [facility] Progress Note    Patient:  Kindra Holt  :  1945   Age:  76 year old   Today's Office Visit:  4/15/2022      HPI: Joined with staff member gurvinder. She had seizure 2022 (she was vomiting, confused, repeats uhmm, she was being weaned off oxcarbazepine).  Since 2021 she continues to have memory decline. She is not able to tolerate other antiepileptic drug.  Per gurvinder, \"she says some things that are goofy, hallucinations of people that are not there (tian on the ceiling talking to her), she is forgetful how to take a shower, she calls objects different names, tends to get agitated when blood sugar is high\".  Caregivers would like to leave her on levetiracetam monotherapy.     Spell Type 1: These are described as  \"odd spells, vomited several times, dud not respond, eyes were closed, increased drooling, very confused, did not follow commands, she is not able remember how to turn on faucet, speech slurred, stiffens\".  No " "clear warning sign. Before June 2020 she had these spells daily.  Last spell was march 2022 and prior to this September 2021.    Caregiver denies dizziness, no double vision, no nausea, no vomiting, no abdominal pain, no mood changes, no ER visits, no hospitalizations.  She has been irritable and defiant. On this visit we spoke about patient's seizures, antiepileptic drug, and plan of epilepsy are. Patient/caregiver was agreeable with plan of care.     Current antiepileptic drug:   Levetiracetam 500 mg morning and 1000 mg evening     Past antiepileptic drug-   Lamotrigine caused rash - 7/2020 she developed lamotrigine induced maculopapular rash in sun exposed areas (face/neck/forearm) with itching, no bleeding or ulceration noted. After stopping lamotrigine her rash resolved.     Oxcarbazepine cause severe mood issues in 12/2021 150 mg morning-300 mg twice a day: \"aggression, defiant, feisty, hallucinating, mean, more flashbacks, accusing housemates, hitting house mate, very agitated\". Weaned off oxcarbazepine.     Exam:   Alert, orientated, speech is fluent, face is symmetric, extra-ocular movement in tact, no focal deficits noted. Nails were painted blue (she enjoys fun nail colors).       Impression:   Recurrent stereotyped spells concerning for focal impaired seizure that progress to generalized tonic-clonic convulsion   Memory decline over the past few year  Lamotrigine induced sun sensitivity rash rarely and aggression with oxcarbazepine  (very sensitive to antiepileptic drugs)  History of DM and CKD  History mild cognitive impairment and mild mental retardation   History of questionable TIA (slurred speech with no acute changes on MRI brain, these may have been seizures)    Discussion:   Discussion: 76 year old RHF with history of epilepsy, DM, and CKD on monotherapy levetiracetam for epilepsy.  Last focal seizures with impairment in awareness  8/2021. In regards to antiseizure medication, she is very " "sensitive to antiepileptic drug she had lamotrigine induced sun sensitivity rash rarely and aggression with oxcarbazepine  (very sensitive to antiepileptic drugs). We have not been able to transition her to another antiepileptic drug. We will continue levetiracetam 500-1000 (most seizure are early morning) It will be helpful to improve glycemic control and we will continue 500-1000. If she has seizure we can increase 750-1000. Patient and caregiver are agreeable to this plan of care.    Plan:   Continue levetiracetam 500 mg morning and 1000 mg night (if she has seizure we can increase levetiracetam 750 mg morning and 1000 mg pm). If she has side effects on levetiracetam then we may consider vimpat.     If she has more seizure we can increase levetiracetam     Monitor for seizures     Memory clinic consult (consider LBD, she has progressive memory decline, progressive decline with ADL). She has seizure infrequently, and seizure or medications would not account for current memory issues.     Follow up  3 months     I spent 19 minutes for patient's medical care. During this time key medical decisions were made with review of medical chart prior to visit, visit with patient, counseling/education, and post visit work, including documentation on the day of visit. I addressed all questions the patient/caregiver raised in regards to the patient's medical care. This note was created with voice recognition software. Inadvertent grammatical errors, typographical errors, and \"sound a like\" substitutions may occur due to limitations of the software.  Read the note carefully and apply context when erroneous substitutions have occurred. Thank you.     Deneen Frazier MD     "

## 2022-04-15 NOTE — PROGRESS NOTES
"Kindra is a 76 year old who is being evaluated via a billable video visit.      How would you like to obtain your AVS? Mail a copy, fax to [phone]  If the video visit is dropped, the invitation should be resent by: Send to e-mail at: [email or web address]  Will anyone else be joining your video visit? Yes: staff. How would they like to receive their invitation? Send to e-mail at: [email or web address]      Video Start Time: 3:33 PM  Video-Visit Details    Type of service:  Video Visit    Video End Time:3:55 pm     Originating Location (pt. Location): Home    Distant [place] ([place]  [facility]     Platform used for Video Visit: [facility]    Patient:  Kindra Holt  :  1945   Age:  76 year old   Today's Office Visit:  4/15/2022      HPI: Joined with staff member gurvinder. She had seizure 2022 (she was vomiting, confused, repeats uhmm, she was being weaned off oxcarbazepine).  Since 2021 she continues to have memory decline. She is not able to tolerate other antiepileptic drug.  Per gurvinder, \"she says some things that are goofy, hallucinations of people that are not there (tian on the ceiling talking to her), she is forgetful how to take a shower, she calls objects different names, tends to get agitated when blood sugar is high\".  Caregivers would like to leave her on levetiracetam monotherapy.     Spell Type 1: These are described as  \"odd spells, vomited several times, dud not respond, eyes were closed, increased drooling, very confused, did not follow commands, she is not able remember how to turn on faucet, speech slurred, stiffens\".  No clear warning sign. Before 2020 she had these spells daily.  Last spell was 2022 and prior to this 2021.    Caregiver denies dizziness, no double vision, no nausea, no vomiting, no abdominal pain, no mood changes, no ER visits, no hospitalizations.  She has been " "irritable and defiant. On this visit we spoke about patient's seizures, antiepileptic drug, and plan of epilepsy are. Patient/caregiver was agreeable with plan of care.     Current antiepileptic drug:   Levetiracetam 500 mg morning and 1000 mg evening     Past antiepileptic drug-   Lamotrigine caused rash - 7/2020 she developed lamotrigine induced maculopapular rash in sun exposed areas (face/neck/forearm) with itching, no bleeding or ulceration noted. After stopping lamotrigine her rash resolved.     Oxcarbazepine cause severe mood issues in 12/2021 150 mg morning-300 mg twice a day: \"aggression, defiant, feisty, hallucinating, mean, more flashbacks, accusing housemates, hitting house mate, very agitated\". Weaned off oxcarbazepine.     Exam:   Alert, orientated, speech is fluent, face is symmetric, extra-ocular movement in tact, no focal deficits noted. Nails were painted blue (she enjoys fun nail colors).       Impression:   Recurrent stereotyped spells concerning for focal impaired seizure that progress to generalized tonic-clonic convulsion   Memory decline over the past few year  Lamotrigine induced sun sensitivity rash rarely and aggression with oxcarbazepine  (very sensitive to antiepileptic drugs)  History of DM and CKD  History mild cognitive impairment and mild mental retardation   History of questionable TIA (slurred speech with no acute changes on MRI brain, these may have been seizures)    Discussion:   Discussion: 76 year old RHF with history of epilepsy, DM, and CKD on monotherapy levetiracetam for epilepsy.  Last focal seizures with impairment in awareness  8/2021. In regards to antiseizure medication, she is very sensitive to antiepileptic drug she had lamotrigine induced sun sensitivity rash rarely and aggression with oxcarbazepine  (very sensitive to antiepileptic drugs). We have not been able to transition her to another antiepileptic drug. We will continue levetiracetam 500-1000 (most seizure " "are early morning) It will be helpful to improve glycemic control and we will continue 500-1000. If she has seizure we can increase 750-1000. Patient and caregiver are agreeable to this plan of care.    Plan:   Continue levetiracetam 500 mg morning and 1000 mg night (if she has seizure we can increase levetiracetam 750 mg morning and 1000 mg pm). If she has side effects on levetiracetam then we may consider vimpat.     If she has more seizure we can increase levetiracetam     Monitor for seizures     Memory clinic consult (consider LBD, she has progressive memory decline, progressive decline with ADL). She has seizure infrequently, and seizure or medications would not account for current memory issues.     Follow up  3 months     I spent 19 minutes for patient's medical care. During this time key medical decisions were made with review of medical chart prior to visit, visit with patient, counseling/education, and post visit work, including documentation on the day of visit. I addressed all questions the patient/caregiver raised in regards to the patient's medical care. This note was created with voice recognition software. Inadvertent grammatical errors, typographical errors, and \"sound a like\" substitutions may occur due to limitations of the software.  Read the note carefully and apply context when erroneous substitutions have occurred. Thank you.     Deneen Frazier MD                     "

## 2022-04-21 NOTE — PROGRESS NOTES
"CC:   Chief Complaint   Patient presents with     New Patient       HPI:  Ms. Kindra oHlt is a 76-year-old female with a past medical history of seizures, mild cognitive impairment and mild mental retardation, hypothyroidism, stage III CKD, HTN, HLD, and type II  diabetes. There is also a questionable history of TIA (2020), patient was symptomatic with slurred speech but MRI showed no changes. She presents to the clinic today for evaluation of memory concerns. She was referred to the memory clinic by Dr. Deneen Frazier at the [facility] who has been following the patient since 2020 for intractable epilepsy. Please see Dr. Frazier's notes for an excellent description of the patient's spells and seizure-related symptoms.      Per chart review, Ms. Holt began suffering from seizures at age 65.  Her seizures are reportedly characterized by an unusual sensation, vomiting, unresponsiveness, eyes closing, drooling, confusion, slurred speech, and stiffening.  She also has generalized tonic-clonic seizures. Seizures were occurring daily, but have reduced in frequency. Ms. Fox has had approximately 5 major seizures over the last 3 years with the most recent occurring in early March 2022. Ms. Fox also has staring spells that occur about once per week, but will reorient if she is spoken to.    Patient has been trialed on multiple seizure medications. Lamotrigine was used 7/2020; she developed lamotrigine induced maculopapular rash in sun exposed areas (face/neck/forearm) with itching, no bleeding or ulceration noted. After stopping lamotrigine her rash resolved. Oxcarbazepine was trialed and caused severe mood issues in 12/2021 150 mg morning-300 mg twice a day: \"aggression, defiant, feisty, hallucinating, mean, more flashbacks, accusing housemates, hitting house mate, very agitated\". She was weaned off oxcarbazepine in January 2022. She is currently on Keppra 500mg AM and 1000mg PM.      She " Form complete.  Awaiting MD sig.   "presents today with Alexsandra who has known Kindra since 3 years ago when she arrived to the group home.  Caregivers have reported a gradual decline in cognitive function starting in 2021. She misrepresents stories (hers a story and then transmits the story in a distorted way that is inconsistent with the story). This has been a chronic issue that Alexsandra noticed when they first met. Since  she says that she talks to a person named Jerod. She describes Jerod as having visited her, talking to her on a ledge. She will say \"Jerod is over there and be mad at you\". Other staff have reported that she directly talks to Jerod. She forgets the sequencing for showers -- her caregivers wash her back and bottom (she does not wipe very well) and Kindra will start with the wrong sequence from washing, rinsing, and drying. She forgets the order of events for morning routine (eating, mediations). She can't find a word and frequently mispronounces words. She will say \"we are in the eep\" and then when people asks what the \"eep\" is, she will again say \"eep\" but with multiple clarifications she will finally say \"jeep\". She calls [facility] \"[facility]\". She never calls the cat the right name, and calls the cat always something different. She impulsively takes mediations that are around her even if they are not hers, so staff has to strictly supervise her. Recall of remote events like her childhood seem relatively intact.    These symptoms are constant and not episodic.    Mood/behavior: Easily frustrated, \"grumpy.\" Patient sees a therapist on a quarterly basis (no past mental health diagnoses). Staff report that Ms. Fox does have auditory and visual hallucinations of \"Jerod\" (one of her  cousins). Hallucinations have been documented with medication changes as well as hyperglycemic episodes. Her highest blood glucoses though are, at the worst, in the 180s. Her average blood glucose is in 120. No " "paranoia. In general, she is \"very easy going\".  Sleep:10 or 11 hours of sleep per night. No dream enactment behavior. She is \"very quiet\" when she sleeps as far as we know and no staff have reported sleeping problems. She does not take Benadryl on a regular basis. She only takes it for allergic reactions, the last time to a skin reaction associated with Lamictal.   Background medical problems: Hyponatremia down to 119. She has had follow-up on this at [facility]. They found a \"white cyst\" on her lip that has an upcoming appointment. No weight loss. Gained 7 pounds. No headaches.  ADLs: Patient received assistance with bathing, brushing her teeth, medications, finances, and meal preparation. She has never driven. Her niece is her legal guardian.  Please see social history for further background on functional status.      MEDS:  Current Outpatient Medications   Medication Sig Dispense Refill     aspirin - buffered (ASCRIPTIN) 325 MG TABS tablet Take 325 mg by mouth daily       cholecalciferol (VITAMIN D3) 25 mcg (1000 units) capsule Take 1 capsule by mouth daily (Patient not taking: Reported on 11/4/2021)       diphenhydrAMINE (BENADRYL) 25 MG capsule Take 1 capsule (25 mg) by mouth every 6 hours as needed for itching or allergies 30 capsule 1     dulaglutide (TRULICITY) 1.5 MG/0.5ML pen Inject 1.5 mg Subcutaneous every 7 days Every 7 days       furosemide (LASIX) 40 MG tablet Take 40 mg by mouth daily       ketoconazole (NIZORAL) 2 % external cream daily as needed        levETIRAcetam (KEPPRA) 1000 MG tablet 500 mg morning and 1000 mg evening 135 tablet 3     levothyroxine (SYNTHROID/LEVOTHROID) 50 MCG tablet Take 50 mcg by mouth daily       lisinopril (ZESTRIL) 40 MG tablet Take 40 mg by mouth daily       metFORMIN (GLUCOPHAGE) 500 MG tablet Take 500 mg by mouth 2 times daily (with meals)       multivitamin w/minerals (MULTI-VITAMIN) tablet Take 1 tablet by mouth daily       simethicone (MYLICON) 80 MG chewable tablet " "Take 80 mg by mouth every 6 hours as needed for flatulence or cramping Before each meal        simvastatin (ZOCOR) 20 MG tablet Take 20 mg by mouth At Bedtime       sitagliptin (JANUVIA) 50 MG tablet Take 50 mg by mouth daily       UNABLE TO FIND Take 80 mg by mouth 3 times daily (with meals) MEDICATION NAME: MI Acid Gas Relief (MAJOR)          PROBLEM LIST:  CKD, DM, HLD, HTN, seizure disorder    FHx:  No known family history of neurological disorder other than father with brain tumor. Her mother passed away from an unknown \"sickness\".    SHx:  Head trauma history  Denies alcohol, marijuana or CBD, recreational drugs  Neurodevelopmental problem: Went to school up to 8th grade then did not continue. [place] her parents  when she was in her 20s. She moved into an apartment in [place] and her sister became her caregiver, then her brother, then her niece. She lived with assistance from caregivers for cooking and arranging the house. She moved into a group home setting 2019 due to safety issues (hygeine, diabetic control, unhealthy diet, lonely).    Physical Exam:  MMSE: -1 [facility]; -1 city; -1 floor; -1 affiliation; -5 WORLD backwards (pre-existing spelling problems); -1 repetition; -2 three step command; -1 overlapping pentagons; -3 delayed recall (+2 with multiple choice, 1 false positive);   Neuro: She is alert, awake, conversant, with fluent speech and appropriate behavior. She is tangential. Some instances of perseverating on the previous topic that requires multiple repetitions to shift set. She confabulates her daily activities and recent events (e.g \"I like to play puzzles and SUSIE\" when she actually does not do puzzles). No bizarre or reality-incompatible confabulations. She has occasional mispronunciations or words but no dysarthria. She cannot write and misspells commons words (e.g. when I asked her to spell WORLD forwards she spelled it \"WORK\" and then \"WORING\" and " "had little insight into whether her attempt was the correct spelling). She can read \"close your eyes\". She is able to stand, walk and sit without assistance. She has a normal gait. Rhomberg negative. Pull test negative. Saccadic initiation, velocity and amplitude are normal except for moderately restricted upgaze amplitude. Normal peripheral visual fields without simultagnosia. No ptosis. Grossly normal and symmetric strength. No tremor. Finger-to-nose is normal bilaterally. Appendicular and axial tone are normal. Deep tendon reflexes symmetrical and normal.       Objective Testing:    Neuropsychological testing (Dr. Rajat Hunt) 3/9/2022: Ms. Holt demonstrated deficits that are consistent with near global brain dysfunction and her history of mild intellectual disability. Within that context, there is suggestion of selectively severe dysfunction of bilateral mesial temporal, frontal, and right hemispheric brain networks.  It is challenging to be certain about her cognitive baseline, given the lack of history.  However, I do suspect that there has been a decline, based on the provided background information and the severity of some of her cognitive deficits. Speculatively, the right hemispheric dysfunction could be attributable to her seizure onset zone. She could also have bilateral mesial temporal seizure onsets.  In this evaluation, she has pronounced dysfunction of learning, memory, nearly all visually mediated processing, and executive functioning abilities.  Her insight is also impaired.  Other cognitive abilities were performed in keeping with her mild intellectual disability.  She has strengths in orientation, semantic fluency, some aspects of cognitive speed.  She is not reporting elevated symptoms of depression or anxiety.    Ambulatory EEG 10/20/2021: This ambulatory electroencephalogram is normal.     Video EEG 6/4/2020: Normal awake and sleep video electroencephalogram    MRI Brain ([place]) " 1/16/2020: Few scattered tiny foci of T2 signal within the white matter consistent with chronic small vessel ischemic changes. I did not personally review this image as it is unavailable on Care Everywhere.    Echocardiogram 1/16/2020: 1. Normal LV size, borderline wall thickness, normal global systolic function with an estimated EF of 70 - 75%. 2. The mitral valve is sclerotic, trace mitral regurgitation. 3. Technically difficult bubble study - appears negative.    Carotid US 1/16/2020: Plaque formation seen within the carotid bifurcations bilaterally extending into the proximal cervical ICAs. Antegrade flow within the vertebral arteries bilaterally with multiphasic flow and the subclavian arteries.     Cardiac Monitoring ([facility]) 1/16/2020: Patient had a min HR of 64 bpm, max HR of 171 bpm, and avg HR of 81 bpm. Predominant underlying rhythm was Sinus Rhythm. 14 Supraventricular Tachycardia runs occurred, the run with the fastest interval lasting 4 beats with a max rate of 171: bpm, the longest lasting 15 beats with an avg rate of 115 bpm. Isolated SVEs were rare (<1.0%). SVE Couplets were rare (<1.0%), and SVE Tripiets were rare (<1.0%). Isolated VEs were rare (<1.0 %). VE Couplets were rare (<1.0%), josh no VE Triplets were present.    Sodium 119 01/03/2022   Chloride 84 01/03/2022   GFR 55 01/03/2022   Ferritin 8.8 09/07/2021   Hgb A1C 8.1 07/20/2020   TSH 2.82 10/07/2019     She saw an endocrinologist within the last several weeks. She will be seeing a nephrologist soon too.    Reportedly her highest blood glucoses though are, at the worst, in the 180s. Her average blood glucose is in 120.     Assessment/Plan:  Ms. Kindra Holt is a 76-year-old right-handed female with progressive cognitive decline. Clinical picture is confounded by the patient's seizure disorder, history of intellectual disability, and metabolic derangements (last sodium I can see is 119, however this apparently is being followed  closely at [facility] but on Care Everywhere but I do not see an update on sodium management or sodium levels). From a neurodegenerative disease standpoint, Lewy body disease is possible given the well formed hallucinations and confabulatory-type memory disorder.     The differential is currently wide and includes Lewy body disease, Alzheimer's disease, normal aging superimposed on neurodevelopmental delay and seizures. Will also check reversible dementia labs.      Further workup:   -B12  -MMA   -CMP  -TSH  -MRI head  -Follow-up after testing    Today, I spent 92 minutes reviewing the chart, personally assessing objective testing, direct patient care, completing documentation and billing.

## 2022-04-22 ENCOUNTER — OFFICE VISIT (OUTPATIENT)
Dept: NEUROLOGY | Facility: CLINIC | Age: 77
End: 2022-04-22
Attending: PSYCHIATRY & NEUROLOGY
Payer: COMMERCIAL

## 2022-04-22 VITALS — BODY MASS INDEX: 31.25 KG/M2 | HEIGHT: 62 IN | WEIGHT: 169.8 LBS

## 2022-04-22 DIAGNOSIS — G40.919 INTRACTABLE EPILEPSY WITHOUT STATUS EPILEPTICUS, UNSPECIFIED EPILEPSY TYPE (H): ICD-10-CM

## 2022-04-22 DIAGNOSIS — F70 MILD INTELLECTUAL DISABILITIES: ICD-10-CM

## 2022-04-22 DIAGNOSIS — R41.89 COGNITIVE DECLINE: Primary | ICD-10-CM

## 2022-04-22 LAB
ALBUMIN SERPL-MCNC: 3.8 G/DL (ref 3.4–5)
ALP SERPL-CCNC: 85 U/L (ref 40–150)
ALT SERPL W P-5'-P-CCNC: 26 U/L (ref 0–50)
ANION GAP SERPL CALCULATED.3IONS-SCNC: 8 MMOL/L (ref 3–14)
AST SERPL W P-5'-P-CCNC: 16 U/L (ref 0–45)
BILIRUB SERPL-MCNC: 0.2 MG/DL (ref 0.2–1.3)
BUN SERPL-MCNC: 20 MG/DL (ref 7–30)
CALCIUM SERPL-MCNC: 9.5 MG/DL (ref 8.5–10.1)
CHLORIDE BLD-SCNC: 99 MMOL/L (ref 94–109)
CO2 SERPL-SCNC: 30 MMOL/L (ref 20–32)
CREAT SERPL-MCNC: 1.08 MG/DL (ref 0.52–1.04)
GFR SERPL CREATININE-BSD FRML MDRD: 52 ML/MIN/1.73M2
GLUCOSE BLD-MCNC: 160 MG/DL (ref 70–99)
POTASSIUM BLD-SCNC: 4.3 MMOL/L (ref 3.4–5.3)
PROT SERPL-MCNC: 7 G/DL (ref 6.8–8.8)
SODIUM SERPL-SCNC: 137 MMOL/L (ref 133–144)
TSH SERPL DL<=0.005 MIU/L-ACNC: 2.74 MU/L (ref 0.4–4)
VIT B12 SERPL-MCNC: 636 PG/ML (ref 193–986)

## 2022-04-22 PROCEDURE — 82607 VITAMIN B-12: CPT | Performed by: PSYCHIATRY & NEUROLOGY

## 2022-04-22 PROCEDURE — 80053 COMPREHEN METABOLIC PANEL: CPT | Performed by: PSYCHIATRY & NEUROLOGY

## 2022-04-22 PROCEDURE — 84443 ASSAY THYROID STIM HORMONE: CPT | Performed by: PSYCHIATRY & NEUROLOGY

## 2022-04-22 PROCEDURE — 83921 ORGANIC ACID SINGLE QUANT: CPT | Performed by: PSYCHIATRY & NEUROLOGY

## 2022-04-22 RX ORDER — LEVETIRACETAM 1000 MG/1
1000 TABLET ORAL EVERY EVENING
COMMUNITY
Start: 2021-08-24 | End: 2023-05-15

## 2022-04-22 RX ORDER — SIMETHICONE 80 MG
80 TABLET,CHEWABLE ORAL 3 TIMES DAILY
COMMUNITY
Start: 2021-07-12 | End: 2023-05-15

## 2022-04-22 RX ORDER — LEVETIRACETAM 500 MG/1
500 TABLET, FILM COATED, EXTENDED RELEASE ORAL EVERY MORNING
COMMUNITY
Start: 2021-12-01 | End: 2023-05-15

## 2022-04-22 RX ORDER — TRIAMCINOLONE ACETONIDE 1 MG/G
OINTMENT TOPICAL PRN
COMMUNITY
Start: 2021-09-10

## 2022-04-22 NOTE — PATIENT INSTRUCTIONS
Currently considering a variety of different causes of your cognitive decline including Lewy body disease, Alzheimer's disease, normal aging superimposed on neurodevelopmental delay and seizures, or metabolic/vitamin/thyroid deficiencies.    We will look into several causes of your cognitive decline including B12 vitamin tests, thyroid tests, liver and metabolic tests and a brain MRI. We will see you back after these tests to discuss the results.

## 2022-04-22 NOTE — LETTER
4/22/2022     RE: Kindra Holt  [address] [place] [id number]     Dear Colleague,    Thank you for referring your patient, Kindra Holt, to the [facility]UR[facility]CIALT[facility] at [facility]. Please see a copy of my visit note below.    CC:   Chief Complaint   Patient presents with     New Patient       HPI:  Ms. Kindra Holt is a 76-year-old female with a past medical history of seizures, mild cognitive impairment and mild mental retardation, hypothyroidism, stage III CKD, HTN, HLD, and type II  diabetes. There is also a questionable history of TIA (2020), patient was symptomatic with slurred speech but MRI showed no changes. She presents to the clinic today for evaluation of memory concerns. She was referred to the memory clinic by Dr. Deneen Frazier at the [facility] who has been following the patient since 2020 for intractable epilepsy. Please see Dr. Frazier's notes for an excellent description of the patient's spells and seizure-related symptoms.      Per chart review, Ms. Holt began suffering from seizures at age 65.  Her seizures are reportedly characterized by an unusual sensation, vomiting, unresponsiveness, eyes closing, drooling, confusion, slurred speech, and stiffening.  She also has generalized tonic-clonic seizures. Seizures were occurring daily, but have reduced in frequency. Ms. Fox has had approximately 5 major seizures over the last 3 years with the most recent occurring in early March 2022. Ms. Fox also has staring spells that occur about once per week, but will reorient if she is spoken to.    Patient has been trialed on multiple seizure medications. Lamotrigine was used 7/2020; she developed lamotrigine induced maculopapular rash in sun exposed areas (face/neck/forearm) with itching, no bleeding or ulceration noted. After stopping lamotrigine her rash resolved. Oxcarbazepine was trialed and caused severe mood  "issues in 12/2021 150 mg morning-300 mg twice a day: \"aggression, defiant, feisty, hallucinating, mean, more flashbacks, accusing housemates, hitting house mate, very agitated\". She was weaned off oxcarbazepine in January 2022. She is currently on Keppra 500mg AM and 1000mg PM.      She presents today with Alexsandra who has known Kindra since 3 years ago when she arrived to the group home.  Caregivers have reported a gradual decline in cognitive function starting in August 2021. She misrepresents stories (hers a story and then transmits the story in a distorted way that is inconsistent with the story). This has been a chronic issue that Alexsandra noticed when they first met. Since 2020 she says that she talks to a person named Jerod. She describes Jerod as having visited her, talking to her on a ledge. She will say \"Jerod is over there and be mad at you\". Other staff have reported that she directly talks to Jerod. She forgets the sequencing for showers -- her caregivers wash her back and bottom (she does not wipe very well) and Kindra will start with the wrong sequence from washing, rinsing, and drying. She forgets the order of events for morning routine (eating, mediations). She can't find a word and frequently mispronounces words. She will say \"we are in the eep\" and then when people asks what the \"eep\" is, she will again say \"eep\" but with multiple clarifications she will finally say \"jeep\". She calls [facility] \"[facility]\". She never calls the cat the right name, and calls the cat always something different. She impulsively takes mediations that are around her even if they are not hers, so staff has to strictly supervise her. Recall of remote events like her childhood seem relatively intact.    These symptoms are constant and not episodic.    Mood/behavior: Easily frustrated, \"grumpy.\" Patient sees a therapist on a quarterly basis (no past mental health diagnoses). Staff report that Ms. Fox does " "have auditory and visual hallucinations of \"Jerod\" (one of her  cousins). Hallucinations have been documented with medication changes as well as hyperglycemic episodes. Her highest blood glucoses though are, at the worst, in the 180s. Her average blood glucose is in 120. No paranoia. In general, she is \"very easy going\".  Sleep:10 or 11 hours of sleep per night. No dream enactment behavior. She is \"very quiet\" when she sleeps as far as we know and no staff have reported sleeping problems. She does not take Benadryl on a regular basis. She only takes it for allergic reactions, the last time to a skin reaction associated with Lamictal.   Background medical problems: Hyponatremia down to 119. She has had follow-up on this at [facility]. They found a \"white cyst\" on her lip that has an upcoming appointment. No weight loss. Gained 7 pounds. No headaches.  ADLs: Patient received assistance with bathing, brushing her teeth, medications, finances, and meal preparation. She has never driven. Her niece is her legal guardian.  Please see social history for further background on functional status.      MEDS:  Current Outpatient Medications   Medication Sig Dispense Refill     aspirin - buffered (ASCRIPTIN) 325 MG TABS tablet Take 325 mg by mouth daily       cholecalciferol (VITAMIN D3) 25 mcg (1000 units) capsule Take 1 capsule by mouth daily (Patient not taking: Reported on 2021)       diphenhydrAMINE (BENADRYL) 25 MG capsule Take 1 capsule (25 mg) by mouth every 6 hours as needed for itching or allergies 30 capsule 1     dulaglutide (TRULICITY) 1.5 MG/0.5ML pen Inject 1.5 mg Subcutaneous every 7 days Every 7 days       furosemide (LASIX) 40 MG tablet Take 40 mg by mouth daily       ketoconazole (NIZORAL) 2 % external cream daily as needed        levETIRAcetam (KEPPRA) 1000 MG tablet 500 mg morning and 1000 mg evening 135 tablet 3     levothyroxine (SYNTHROID/LEVOTHROID) 50 MCG tablet Take 50 mcg by mouth daily       " "lisinopril (ZESTRIL) 40 MG tablet Take 40 mg by mouth daily       metFORMIN (GLUCOPHAGE) 500 MG tablet Take 500 mg by mouth 2 times daily (with meals)       multivitamin w/minerals (MULTI-VITAMIN) tablet Take 1 tablet by mouth daily       simethicone (MYLICON) 80 MG chewable tablet Take 80 mg by mouth every 6 hours as needed for flatulence or cramping Before each meal        simvastatin (ZOCOR) 20 MG tablet Take 20 mg by mouth At Bedtime       sitagliptin (JANUVIA) 50 MG tablet Take 50 mg by mouth daily       UNABLE TO FIND Take 80 mg by mouth 3 times daily (with meals) MEDICATION NAME: MI Acid Gas Relief (MAJOR)          PROBLEM LIST:  CKD, DM, HLD, HTN, seizure disorder    FHx:  No known family history of neurological disorder other than father with brain tumor. Her mother passed away from an unknown \"sickness\".    SHx:  Head trauma history  Denies alcohol, marijuana or CBD, recreational drugs  Neurodevelopmental problem: Went to school up to 8th grade then did not continue. [place] until her parents  when she was in her 20s. She moved into an apartment in [place] and her sister became her caregiver, then her brother, then her niece. She lived with assistance from caregivers for cooking and arranging the house. She moved into a group home setting 2019 due to safety issues (hygeine, diabetic control, unhealthy diet, lonely).    Physical Exam:  MMSE: -1 [facility]; -1 city; -1 floor; -1 affiliation; -5 WORLD backwards (pre-existing spelling problems); -1 repetition; -2 three step command; -1 overlapping pentagons; -3 delayed recall (+2 with multiple choice, 1 false positive);   Neuro: She is alert, awake, conversant, with fluent speech and appropriate behavior. She is tangential. Some instances of perseverating on the previous topic that requires multiple repetitions to shift set. She confabulates her daily activities and recent events (e.g \"I like to play puzzles and SUSIE\" when she " "actually does not do puzzles). No bizarre or reality-incompatible confabulations. She has occasional mispronunciations or words but no dysarthria. She cannot write and misspells commons words (e.g. when I asked her to spell WORLD forwards she spelled it \"WORK\" and then \"WORING\" and had little insight into whether her attempt was the correct spelling). She can read \"close your eyes\". She is able to stand, walk and sit without assistance. She has a normal gait. Rhomberg negative. Pull test negative. Saccadic initiation, velocity and amplitude are normal except for moderately restricted upgaze amplitude. Normal peripheral visual fields without simultagnosia. No ptosis. Grossly normal and symmetric strength. No tremor. Finger-to-nose is normal bilaterally. Appendicular and axial tone are normal. Deep tendon reflexes symmetrical and normal.       Objective Testing:    Neuropsychological testing (Dr. Rajat Hunt) 3/9/2022: Ms. Holt demonstrated deficits that are consistent with near global brain dysfunction and her history of mild intellectual disability. Within that context, there is suggestion of selectively severe dysfunction of bilateral mesial temporal, frontal, and right hemispheric brain networks.  It is challenging to be certain about her cognitive baseline, given the lack of history.  However, I do suspect that there has been a decline, based on the provided background information and the severity of some of her cognitive deficits. Speculatively, the right hemispheric dysfunction could be attributable to her seizure onset zone. She could also have bilateral mesial temporal seizure onsets.  In this evaluation, she has pronounced dysfunction of learning, memory, nearly all visually mediated processing, and executive functioning abilities.  Her insight is also impaired.  Other cognitive abilities were performed in keeping with her mild intellectual disability.  She has strengths in orientation, semantic " fluency, some aspects of cognitive speed.  She is not reporting elevated symptoms of depression or anxiety.    Ambulatory EEG 10/20/2021: This ambulatory electroencephalogram is normal.     Video EEG 6/4/2020: Normal awake and sleep video electroencephalogram    MRI Brain ([facility]) 1/16/2020: Few scattered tiny foci of T2 signal within the white matter consistent with chronic small vessel ischemic changes. I did not personally review this image as it is unavailable on Care Everywhere.    Echocardiogram 1/16/2020: 1. Normal LV size, borderline wall thickness, normal global systolic function with an estimated EF of 70 - 75%. 2. The mitral valve is sclerotic, trace mitral regurgitation. 3. Technically difficult bubble study - appears negative.    Carotid US 1/16/2020: Plaque formation seen within the carotid bifurcations bilaterally extending into the proximal cervical ICAs. Antegrade flow within the vertebral arteries bilaterally with multiphasic flow and the subclavian arteries.     Cardiac Monitoring ([facility]) 1/16/2020: Patient had a min HR of 64 bpm, max HR of 171 bpm, and avg HR of 81 bpm. Predominant underlying rhythm was Sinus Rhythm. 14 Supraventricular Tachycardia runs occurred, the run with the fastest interval lasting 4 beats with a max rate of 171: bpm, the longest lasting 15 beats with an avg rate of 115 bpm. Isolated SVEs were rare (<1.0%). SVE Couplets were rare (<1.0%), and SVE Tripiets were rare (<1.0%). Isolated VEs were rare (<1.0 %). VE Couplets were rare (<1.0%), josh no VE Triplets were present.    Sodium 119 01/03/2022   Chloride 84 01/03/2022   GFR 55 01/03/2022   Ferritin 8.8 09/07/2021   Hgb A1C 8.1 07/20/2020   TSH 2.82 10/07/2019     She saw an endocrinologist within the last several weeks. She will be seeing a nephrologist soon too.    Reportedly her highest blood glucoses though are, at the worst, in the 180s. Her average blood glucose is in 120.     Assessment/Plan:  Ms. Kindra NEWMAN  Yanet is a 76-year-old right-handed female with progressive cognitive decline. Clinical picture is confounded by the patient's seizure disorder, history of intellectual disability, and metabolic derangements (last sodium I can see is 119, however this apparently is being followed closely at [facility] but on Care Everywhere but I do not see an update on sodium management or sodium levels). From a neurodegenerative disease standpoint, Lewy body disease is possible given the well formed hallucinations and confabulatory-type memory disorder.     The differential is currently wide and includes Lewy body disease, Alzheimer's disease, normal aging superimposed on neurodevelopmental delay and seizures. Will also check reversible dementia labs.      Further workup:   -B12  -MMA   -CMP  -TSH  -MRI head  -Follow-up after testing    Today, I spent 92 minutes reviewing the chart, personally assessing objective testing, direct patient care, completing documentation and billing.    Again, thank you for allowing me to participate in the care of your patient.      Sincerely,    Iggy Leslie MD

## 2022-04-26 LAB — METHYLMALONATE SERPL-SCNC: 0.21 UMOL/L (ref 0–0.4)

## 2022-04-28 ENCOUNTER — TRANSFERRED RECORDS (OUTPATIENT)
Dept: HEALTH INFORMATION MANAGEMENT | Facility: CLINIC | Age: 77
End: 2022-04-28
Payer: COMMERCIAL

## 2022-05-25 ENCOUNTER — VIRTUAL VISIT (OUTPATIENT)
Dept: NEUROLOGY | Facility: CLINIC | Age: 77
End: 2022-05-25
Payer: COMMERCIAL

## 2022-05-25 DIAGNOSIS — G40.919 INTRACTABLE EPILEPSY WITHOUT STATUS EPILEPTICUS, UNSPECIFIED EPILEPSY TYPE (H): ICD-10-CM

## 2022-05-25 DIAGNOSIS — F70 MILD INTELLECTUAL DISABILITIES: ICD-10-CM

## 2022-05-25 DIAGNOSIS — R41.89 COGNITIVE DECLINE: Primary | ICD-10-CM

## 2022-05-25 NOTE — LETTER
"5/25/2022     RE: Kindra Holt  [address] [facility] [address]     Dear Colleague,    Thank you for referring your patient, Kindra Holt, to the [facility]UROSPECIALTIES at [facility]. Please see a copy of my visit note below.    Kindra is a 76 year old who is being evaluated via a billable video visit.      How would you like to obtain your AVS? MyChart  If the video visit is dropped, the invitation should be resent by: Text to cell phone: Call [phone]  Will anyone else be joining your video visit? Yes: Alexsandra, . How would they like to receive their invitation? Send to e-mail at: [email or web address]      Video Start Time: 10:48AM  Video-Visit Details    Type of service:  Video Visit    Video End Time:10:59 AM    Originating Location (pt. Location): Home    Distant Location (provider location):  [facility]UROSPECIALTIES     Platform used for Video Visit: [employer]       Ms. Kindra Holt is a 76-year-old female with a past medical history of epilepsy (first seizures occurring in her 60s, managed under the excellent care of Dr. Frazier), mild cognitive impairment and lifelong intellectual disability, hypothyroidism, stage III CKD, HTN, HLD, and type II  diabetes. There is also a questionable history of TIA (2020), patient was symptomatic with slurred speech but MRI showed no changes. She presented to our clinic on 4/11/2022 for evaluation of persistent, non-paroxysmal memory concerns with visions of \"Jerod\", made-up stories, and inaccurately mixing together events/reports. She scored 14/30 on MMSE. She saw Dr. Hunt on March 3, [id number] who noted global brain dysfunction with a predilection for medial temporal, frontal and right hemispheric functioning. EEGs in 2021 and 2022 Her clinical picture is confounded by the patient's seizure disorder and history of intellectual disability. From a neurodegenerative disease standpoint, Lewy body " disease is possible given the well formed hallucinations and confabulatory-type memory disorder.     Work-up ordered at our last visit:  -B12  -MMA   -CMP  -TSH  -MRI head    Labs were normal other than known CKD (eGFR of 52).   MRI was normal and did not show any atrophy beyond what would be expected for age 76.     At this point, the diagnosis is unclear. The picture continues to be clouded by epilepsy and intellectual disability, which can accelerate the normal aging process and give rise to what appears to be a progression neurodegenerative disease. While there is no evidence for neurodegenerative disease on her MRI given the lack of volume loss, MRI is not an entirely sensitive tool for closing the door on the possibility of a neurodegenerative disease. We discussed obtaining an FDG-PET scan, which is more sensitive than MRI for neurodegenerative disease and could  characteristic hypometabolic patterns associated with various neurodegenerative conditions. We also discussed longitudinal evaluation and repeat neuropsychological testing with Dr. Hunt in 1 year, which would also provide additional diagnostic information with the passage of time. We opted for the latter plan.    - Neuropsychological testing in 1 year with follow-up with me.    We discussed care giving and they report doing well on this front.    General recommendations:  - Stay intellectually, socially and physically active  - Healthy, balanced diet.    Today, I spent 39 minutes reviewing the chart, personally assessing objective testing, direct patient care, completing documentation and billing.    Again, thank you for allowing me to participate in the care of your patient.      Sincerely,    Iggy Leslie MD

## 2022-05-25 NOTE — PROGRESS NOTES
"Kindra is a 76 year old who is being evaluated via a billable video visit.      How would you like to obtain your AVS? MyChart  If the video visit is dropped, the invitation should be resent by: Text to cell phone: Call [phone]  Will anyone else be joining your video visit? Yes: Alexsandra, . How would they like to receive their invitation? Send to e-mail at: [email or web address]      Video Start Time: 10:48AM  Video-Visit Details    Type of service:  Video Visit    Video End Time:10:59 AM    Originating Location (pt. Location): Home    Distant Location (provider location):  [facility] NEUROSPECIALTIES     Platform used for Video Visit: [employer]       Ms. Kindra Holt is a 76-year-old female with a past medical history of epilepsy (first seizures occurring in her 60s, managed under the excellent care of Dr. Frazier), mild cognitive impairment and lifelong intellectual disability, hypothyroidism, stage III CKD, HTN, HLD, and type II  diabetes. There is also a questionable history of TIA (2020), patient was symptomatic with slurred speech but MRI showed no changes. She presented to our clinic on 4/11/2022 for evaluation of persistent, non-paroxysmal memory concerns with visions of \"Jerod\", made-up stories, and inaccurately mixing together events/reports. She scored 14/30 on MMSE. She saw Dr. Hunt on March 3, [id number] who noted global brain dysfunction with a predilection for medial temporal, frontal and right hemispheric functioning. EEGs in 2021 and 2022 Her clinical picture is confounded by the patient's seizure disorder and history of intellectual disability. From a neurodegenerative disease standpoint, Lewy body disease is possible given the well formed hallucinations and confabulatory-type memory disorder.     Work-up ordered at our last visit:  -B12  -MMA   -CMP  -TSH  -MRI head    Labs were normal other than known CKD (eGFR of 52).   MRI was normal and did not show any atrophy beyond what would be " expected for age 76.     At this point, the diagnosis is unclear. The picture continues to be clouded by epilepsy and intellectual disability, which can accelerate the normal aging process and give rise to what appears to be a progression neurodegenerative disease. While there is no evidence for neurodegenerative disease on her MRI given the lack of volume loss, MRI is not an entirely sensitive tool for closing the door on the possibility of a neurodegenerative disease. We discussed obtaining an FDG-PET scan, which is more sensitive than MRI for neurodegenerative disease and could  characteristic hypometabolic patterns associated with various neurodegenerative conditions. We also discussed longitudinal evaluation and repeat neuropsychological testing with Dr. Hunt in 1 year, which would also provide additional diagnostic information with the passage of time. We opted for the latter plan.    - Neuropsychological testing in 1 year with follow-up with me.    We discussed care giving and they report doing well on this front.    General recommendations:  - Stay intellectually, socially and physically active  - Healthy, balanced diet.    Today, I spent 39 minutes reviewing the chart, personally assessing objective testing, direct patient care, completing documentation and billing.

## 2022-09-24 ENCOUNTER — HEALTH MAINTENANCE LETTER (OUTPATIENT)
Age: 77
End: 2022-09-24

## 2022-12-12 DIAGNOSIS — G40.919 INTRACTABLE EPILEPSY WITHOUT STATUS EPILEPTICUS, UNSPECIFIED EPILEPSY TYPE (H): ICD-10-CM

## 2022-12-14 RX ORDER — LEVETIRACETAM 1000 MG/1
TABLET ORAL
Qty: 135 TABLET | Refills: 3 | OUTPATIENT
Start: 2022-12-14

## 2022-12-26 ENCOUNTER — VIRTUAL VISIT (OUTPATIENT)
Dept: NEUROLOGY | Facility: CLINIC | Age: 77
End: 2022-12-26
Payer: COMMERCIAL

## 2022-12-26 VITALS — HEIGHT: 62 IN | WEIGHT: 168 LBS | BODY MASS INDEX: 30.91 KG/M2

## 2022-12-26 DIAGNOSIS — G40.919 INTRACTABLE EPILEPSY WITHOUT STATUS EPILEPTICUS, UNSPECIFIED EPILEPSY TYPE (H): Primary | ICD-10-CM

## 2022-12-26 RX ORDER — LACOSAMIDE 50 MG/1
TABLET ORAL
Qty: 180 TABLET | Refills: 5 | Status: SHIPPED | OUTPATIENT
Start: 2022-12-26 | End: 2023-05-15

## 2022-12-26 ASSESSMENT — PAIN SCALES - GENERAL: PAINLEVEL: NO PAIN (0)

## 2022-12-26 NOTE — LETTER
"2022       RE: Kindra Holt  : 1945   MRN: [id number]      Dear Colleague,    Thank you for referring your patient, Kindra Holt, to the [facility] EPILEPSY CARE at [facility]. Please see a copy of my visit note below.    none      Kidnra is a 77 year old who is being evaluated via a billable video visit.       How would you like to obtain your AVS? MyChart  Will anyone else be joining your video visit? Yes,           Video-Visit Details     Type of service:  Video Visit   Video Start Time: 11:55 AM  Video End Time:12:05 PM    Originating Location (pt. Location): Home    Distant Location (provider location):  Off-site  Platform used for Video Visit: [facility] Epilepsy Care Progress Note    Patient:  Kindra Holt  :  1945   Age:  77 year old   Today's Office Visit:  2022    HPI: Joined with staff member gurvinder. Her mood has worsened, she isolates, very irritable. No physical outburst (no hitting, no biting, and no throwing). She saw neurocognitive and there is no decline. She does not like her new house mate.     She had spell 2022 (choked and appeared confused) and  2022 (she was vomiting, confused, repeats uhmm, she was being weaned off oxcarbazepine).  Since 2021 she continues to have memory decline. She is not able to tolerate other antiepileptic drug.  Caregiver denies dizziness, no double vision, no nausea, no vomiting, no abdominal pain, no mood changes, no ER visits, no hospitalizations.  She has been irritable and defiant. On this visit we spoke about patient's seizures, antiepileptic drug, and plan of epilepsy are. Patient/caregiver was agreeable with plan of care.     Spell Type 1: These are described as  \"odd spells, vomited several times, dud not respond, eyes were closed, increased drooling, very confused, did not follow commands, she is not able remember how to turn on faucet, speech slurred, " "stiffens\".  No clear warning sign. Before June 2020 she had these spells daily.  Last spell was march 2022 and prior to this September 2021.    Current antiepileptic drug:   Levetiracetam 500 mg morning and 1000 mg evening     Past antiepileptic drug-   Lamotrigine caused rash - 7/2020 she developed lamotrigine induced maculopapular rash in sun exposed areas (face/neck/forearm) with itching, no bleeding or ulceration noted. After stopping lamotrigine her rash resolved.     Oxcarbazepine cause severe mood issues in 12/2021 150 mg morning-300 mg twice a day: \"aggression, defiant, feisty, hallucinating, mean, more flashbacks, accusing housemates, hitting house mate, very agitated\". Weaned off oxcarbazepine.     Exam:   Alert, orientated, speech is fluent, face is symmetric, extra-ocular movement in tact, no focal deficits noted. Nails were painted blue (she enjoys fun nail colors).       Impression:   Recurrent stereotyped spells concerning for focal impaired seizure that progress to generalized tonic-clonic convulsion   Memory decline over the past few year  Lamotrigine induced sun sensitivity rash rarely and aggression with oxcarbazepine  (very sensitive to antiepileptic drugs)  History of DM and CKD  History mild cognitive impairment and mild mental retardation   History of questionable TIA (slurred speech with no acute changes on MRI brain, these may have been seizures)    Discussion:   Discussion: 77 year old RHF with history of epilepsy, DM, and CKD on monotherapy levetiracetam for epilepsy.  Last focal seizures with impairment in awareness  8/2021. In regards to antiseizure medication, she is very sensitive to antiepileptic drug she had lamotrigine induced sun sensitivity rash rarely and aggression with oxcarbazepine  (very sensitive to antiepileptic drugs). We have not been able to transition her to another antiepileptic drug. We will continue levetiracetam 500-1000 (most seizure are early morning. I " "recommended starting lacosamide because of her mood issues on levetiracetam. Caregivers will think about antiepileptic drug changes.      Plan:   Continue levetiracetam 500 mg morning and 1000 mg night    Start lacosamide 50 mg tablet (vimpat is brand name of medication, you will take generic formulation lacosamide)   Week 1: 1 tablet morning and night   Week 2: 2 tablet morning and night   Week 3-onward: 3 tablet morning and night     Major common side effects for headaches, tiredness, cardiac side effects (atrial fibrillation and flutter, first degree atrioventricular block, prolonged [place] interval in 0.4%), dizziness, double vision, nausea, vomiting, rash, or mood changes. Please call [facility] for any major side effects [phone].        Follow up  4 months     I spent 12 minutes for patient's medical care. During this time key medical decisions were made with review of medical chart prior to visit, visit with patient, counseling/education, and post visit work, including documentation on the day of visit. I addressed all questions the patient/caregiver raised in regards to the patient's medical care. This note was created with voice recognition software. Inadvertent grammatical errors, typographical errors, and \"sound a like\" substitutions may occur due to limitations of the software.  Read the note carefully and apply context when erroneous substitutions have occurred. Thank you.     Deneen Frazier MD                       Again, thank you for allowing me to participate in the care of your patient.      Sincerely,    Deneen Frazier MD      "

## 2022-12-26 NOTE — PATIENT INSTRUCTIONS
Continue levetiracetam 500 mg morning and 1000 mg night    Start lacosamide 50 mg tablet (vimpat is brand name of medication, you will take generic formulation lacosamide)       Week 1: 1 tablet morning and night   Week 2: 2 tablet morning and night   Week 3-onward: 3 tablet morning and night     Major common side effects for headaches, tiredness, cardiac side effects (atrial fibrillation and flutter, first degree atrioventricular block, prolonged [place] interval in 0.4%), dizziness, double vision, nausea, vomiting, rash, or mood changes. Please call [facility] for any major side effects [phone].      Deneen Frazier MD     Follow up  4 months

## 2022-12-26 NOTE — PROGRESS NOTES
"Kindra is a 77 year old who is being evaluated via a billable video visit.       How would you like to obtain your AVS? MyChart  Will anyone else be joining your video visit? Yes,           Video-Visit Details     Type of service:  Video Visit   Video Start Time: 11:55 AM  Video End Time:12:05 PM    Originating Location (pt. Location): Home    Distant Location (provider location):  Off-site  Platform used for Video Visit: [facility] Epilepsy Care Progress Note    Patient:  Kindra Holt  :  1945   Age:  77 year old   Today's Office Visit:  2022    HPI: Joined with staff member gurvinder. Her mood has worsened, she isolates, very irritable. No physical outburst (no hitting, no biting, and no throwing). She saw neurocognitive and there is no decline. She does not like her new house mate.     She had spell 2022 (choked and appeared confused) and  2022 (she was vomiting, confused, repeats uhmm, she was being weaned off oxcarbazepine).  Since 2021 she continues to have memory decline. She is not able to tolerate other antiepileptic drug.  Caregiver denies dizziness, no double vision, no nausea, no vomiting, no abdominal pain, no mood changes, no ER visits, no hospitalizations.  She has been irritable and defiant. On this visit we spoke about patient's seizures, antiepileptic drug, and plan of epilepsy are. Patient/caregiver was agreeable with plan of care.     Spell Type 1: These are described as  \"odd spells, vomited several times, dud not respond, eyes were closed, increased drooling, very confused, did not follow commands, she is not able remember how to turn on faucet, speech slurred, stiffens\".  No clear warning sign. Before 2020 she had these spells daily.  Last spell was 2022 and prior to this 2021.    Current antiepileptic drug:   Levetiracetam 500 mg morning and 1000 mg evening     Past antiepileptic drug-   Lamotrigine caused rash - 2020 she developed " "lamotrigine induced maculopapular rash in sun exposed areas (face/neck/forearm) with itching, no bleeding or ulceration noted. After stopping lamotrigine her rash resolved.     Oxcarbazepine cause severe mood issues in 12/2021 150 mg morning-300 mg twice a day: \"aggression, defiant, feisty, hallucinating, mean, more flashbacks, accusing housemates, hitting house mate, very agitated\". Weaned off oxcarbazepine.     Exam:   Alert, orientated, speech is fluent, face is symmetric, extra-ocular movement in tact, no focal deficits noted. Nails were painted blue (she enjoys fun nail colors).       Impression:   Recurrent stereotyped spells concerning for focal impaired seizure that progress to generalized tonic-clonic convulsion   Memory decline over the past few year  Lamotrigine induced sun sensitivity rash rarely and aggression with oxcarbazepine  (very sensitive to antiepileptic drugs)  History of DM and CKD  History mild cognitive impairment and mild mental retardation   History of questionable TIA (slurred speech with no acute changes on MRI brain, these may have been seizures)    Discussion:   Discussion: 77 year old RHF with history of epilepsy, DM, and CKD on monotherapy levetiracetam for epilepsy.  Last focal seizures with impairment in awareness  8/2021. In regards to antiseizure medication, she is very sensitive to antiepileptic drug she had lamotrigine induced sun sensitivity rash rarely and aggression with oxcarbazepine  (very sensitive to antiepileptic drugs). We have not been able to transition her to another antiepileptic drug. We will continue levetiracetam 500-1000 (most seizure are early morning. I recommended starting lacosamide because of her mood issues on levetiracetam. Caregivers will think about antiepileptic drug changes.      Plan:   Continue levetiracetam 500 mg morning and 1000 mg night    Start lacosamide 50 mg tablet (vimpat is brand name of medication, you will take generic formulation " "lacosamide)   Week 1: 1 tablet morning and night   Week 2: 2 tablet morning and night   Week 3-onward: 3 tablet morning and night     Major common side effects for headaches, tiredness, cardiac side effects (atrial fibrillation and flutter, first degree atrioventricular block, prolonged [place] interval in 0.4%), dizziness, double vision, nausea, vomiting, rash, or mood changes. Please call [facility] for any major side effects [phone].        Follow up  4 months     I spent 12 minutes for patient's medical care. During this time key medical decisions were made with review of medical chart prior to visit, visit with patient, counseling/education, and post visit work, including documentation on the day of visit. I addressed all questions the patient/caregiver raised in regards to the patient's medical care. This note was created with voice recognition software. Inadvertent grammatical errors, typographical errors, and \"sound a like\" substitutions may occur due to limitations of the software.  Read the note carefully and apply context when erroneous substitutions have occurred. Thank you.     Deneen Frazier MD                   "

## 2022-12-26 NOTE — LETTER
Date:December 26, 2022      Patient was self referred, no letter generated. Do not send.        [facility] Health Information

## 2023-01-02 ENCOUNTER — DOCUMENTATION ONLY (OUTPATIENT)
Dept: OTHER | Facility: CLINIC | Age: 78
End: 2023-01-02

## 2023-01-11 DIAGNOSIS — G40.919 INTRACTABLE EPILEPSY WITHOUT STATUS EPILEPTICUS, UNSPECIFIED EPILEPSY TYPE (H): ICD-10-CM

## 2023-01-16 RX ORDER — LEVETIRACETAM 1000 MG/1
TABLET ORAL
Qty: 135 TABLET | Refills: 3 | Status: SHIPPED | OUTPATIENT
Start: 2023-01-16 | End: 2023-05-15

## 2023-02-13 ENCOUNTER — OFFICE VISIT (OUTPATIENT)
Dept: NEUROLOGY | Facility: CLINIC | Age: 78
End: 2023-02-13
Attending: PSYCHIATRY & NEUROLOGY
Payer: COMMERCIAL

## 2023-02-13 DIAGNOSIS — F03.918: Primary | ICD-10-CM

## 2023-02-13 DIAGNOSIS — G40.919 INTRACTABLE EPILEPSY WITHOUT STATUS EPILEPTICUS, UNSPECIFIED EPILEPSY TYPE (H): ICD-10-CM

## 2023-02-13 DIAGNOSIS — F70 MILD INTELLECTUAL DISABILITIES: ICD-10-CM

## 2023-02-13 NOTE — LETTER
2023     RE: Kindra Holt  [address] [place] [id number]     Dear Colleague,    Thank you for referring your patient, Kindra Holt, to the [facility] NEUROSPECIALTIES at [facility]. Please see a copy of my visit note below.    Patient was seen for neuropsychological evaluation at the request of Dr. Iggy Leslie, for the purposes of diagnostic clarification and treatment planning.  1 hrs 20 min of test administration and scoring were provided by this writer, Angelica Galvez.  Please see Dr. Rich Selby's report for a full interpretation of the findings.      Adult Neuropsychology Clinic  [facility]      NEUROPSYCHOLOGICAL EVALUATION    RELEVANT HISTORY AND REASON FOR REFERRAL    This is a report of neuropsychological consultation regarding Kindra Holt, a 77-year-old year old, right handed woman with a history of unspecified intellectual/developmental disability and 8 years of formal education. Relevant history also includes epilepsy that began at age 65, hypothyroidism, type-2 diabetes, hypertension, hyperlipidemia, and stage-3 chronic kidney disease. She was seen for a baseline evaluation by my colleague, Dr. Rajat Hunt, on 3/9/2022, given concerns of cognitive decline over several years coming from staff at her group home. She was more forgetful and confused than usual, showed abnormal communication and language struggles, was grumpier and more frustrated, was impulsive, and had recurrent visual and auditory hallucinations of a  cousin. Dr. Hunt s data showed global cognitive deficits consistent with developmental delays but pronounced abnormalities in memory and executive functioning. He felt there was likely a decline from baseline. She follows with Dr. Deneen Frazier for seizure management. She started seeing Dr. Jamal Leslie in the memory clinic in April and May 2022. Dr. Leslie referred her for this reevaluation to assess  stability versus change in her condition, as an aid in diagnosis and treatment planning.     Reported early history includes developmental delays noticed since infancy. She left school after 8th grade. She lived with her parents for many years, then with a sister, a brother, and a niece. Her niece is now guardian. Reported family history includes a brain tumor for her father. Otherwise, there is no known history of neurological syndromes.     Ms. Holt is accompanied today by the manager from her group home, Alexsandra, who was also with her at last year s visit. Alexsandra reports that Ms. Holt appears more tired, more frequently makes nonsensical statements or shows unusual word-finding lapses, is more frequently confused, and notably more crabby. Sometimes, she displays remarkably clear recollections.     She has been living in the group home since 2019. The move was prompted by safety concerns. She had been living semi-independently with family and PCA support. At the home, she requires assistance with bathing, brushing teeth, medications, and meals. Her hygiene has worsened. She often tells staff she just took a shower, but it turns out it has been several days since bathing.     Over the last few months, there have been mood swings, isolating, and being rude/bossy to other residents. They have moved her to an upstairs room to be closer to staff observation. A new resident joined the home last September, and Ms. Holt apparently dislikes her.     There was a question of Keppra potentially being related to the irritability, and Dr. Frazier has advised shifting to Vimpat. That has not yet happened. Dr. Frazier s notes have excellent histories and characterizations of seizure types. As noted, seizures began at age 65. She has GTCs a few times per year. The last was in the summer. Staring spells are observed multiple times per day, a few days per week.     There was a questionable TIA in 2020 for slurred speech.  There has never been a stroke event.     Whether or not she hears things clearly is hit and miss, but staff do not have clear indications of reduced hearing acuity. No other sensory abnormalities are endorsed.     Dr. Lelsie s physical exam did not indicate parkinsonism. She has not had any falls. Alexsandra reports that her walking has slowed noticeably. At baseline, she was  a powerhouse  and they had trouble keeping up with her on group outings. Now, she lags behind and cannot seem to keep up.     Daytime energy levels are lower than usual, but she is still very active. She goes on outings and/or supported employment shifts 4 days per week. When she gets home, she tends to go straight to bed. They are planning to cut back to 3 days per week to not exhaust her.     Overnight sleep is on the order of 11-12 hours, longer on days without outings. There have been no indications of REM sleep behaviors.     She continues to have hallucinations centering on her cousin, Jerod. The occur only once in a while or not too often.     Her blood sugar checks have been running higher than usual. The last A1c was 7.2.     She does not use alcohol, tobacco, or illicit drugs.     Current medications are Keppra, Januvia, Trulicity, metformin, levothyroxine, lisinopril, simvastatin, 325 mg aspirin, multivitamin, D3, and simethicone.     BEHAVIORAL OBSERVATIONS    Ms. Holt was polite and cooperative with the evaluation. She was alert and not somnolent. She was markedly perseverative and unable to provide much meaningful history. Speech was characterized by mild dysarthria, consistent with expectations for a developmental speech impediment. Motor abnormalities were not observed. She needed simplifications of test instructions. She tended to give up but would then continue appropriately with encouragement. She persisted well with requested procedures. The data are seen as valid estimates of her cognitive status.     MEASURES  ADMINISTERED    In addition to conducting a clinical interview, I directly administered the following measures:     Test for Severe Impairment; Clock Drawing; Dementia Screening Questionnaire for Individuals with Intellectual Disabilities (completed by her ).     The following measures were administered by a trained psychometrist, under my supervision:    Orientation: Time, Place, Basic Personal Information, Recent [place] Presidents; Wechsler Adult Intelligence Scales-IV: Similarities, Information, Block Design, Digit Span, Coding; Controlled Oral Word Association Test; Animal Naming Test; Hop Bottom Naming Test; Crystal Visual Acuity Screen; Finger Tapping; Trail Making Test; Stroop Test; Valles Verbal Learning Test, Revised.    RESULTS AND INTERPRETATION    Collateral Report on Functioning: Her  endorsed 22 new or worsening problems in daily functioning on the DSQIID. This is beyond the cutoff for inferring a clinically significant deficit.     Global Screening: Her performance on the TSI was 19/24. She lost points for ideomotor praxis, general fund of knowledge, conceptualization, and recent memory.     Orientation: Orientation to time was mildly below normal expectations. Orientation to place was limited to the city. Orientation to basic personal information was normal. She was unable to name the current or any other [place] presidents.    Language & Related Skills: Abstract verbal analogical reasoning was exceptionally low. Demonstrating general fund of information was exceptionally low. Confrontation naming was exceptionally low. Letter-based verbal fluency was below average. Category-based verbal fluency was low average.    Visual Perceptual & Constructional Skills: Binocular, corrected, near-point visual acuity was 20/30 on Crystal screening. Visuospatial reasoning through hands-on object assembly was exceptionally low. She was unable to make any responses for drawing a clock. She  was unable to accurately copy a pre-drawn clock.     Motor Skills: Speeded finger tapping was below average bilaterally, with no major difference between hands.     Mental Speed & Executive Functioning: Cognitive processing speed was below average on a timed transcription task. Visual scanning and graphomotor sequencing under simple conditions was below average for speed but performed without error. Scanning and sequencing under greater executive demands to control divided attention was so low she could not complete the task. Speeded word reading was below average. Speeded color naming was below average. Speeded response inhibition was below average.     Attention & Working Memory: Immediate auditory attention and working memory were exceptionally low for repeating and rearranging digit strings.    Learning & Anterograde Memory: As noted, she had errors on memory items on the TSI. Learning a word list over repeated readings was exceptionally low. Delayed free recall of the list was exceptionally low (zero recall), and delayed recognition of the list was exceptionally low (blanket endorsed all options, including all non-list foils).     IMPRESSIONS    The neuropsychological results are abnormal. Compared to testing 11 months ago, there has not been a marked decline. Most scores are stable within test-retest variability. Ms. Holt continues to display global impairment. The data do not suggest focal or lateralized cerebral dysfunction. There is diffuse dysfunction. I do believe that she has declined cognitively from her lifelong baseline in recent years, but there has not been any sort of precipitous cognitive decline since last year s evaluation.     In the setting of already low cognitive baselines, it is hard to measure decline. Signs of an additional dementia process are typically more observable in emotional and behavioral matters. Her , who has known her well for 4 years, reports  substantial changes in mood, temperament, daily living skills, and overall functional capacity. Information from interview and the DSQIID do suggest a dementia syndrome on top of the developmental disability. Epilepsy likely contributes, as does her advanced age. Alzheimer s pathology cannot be ruled out.     RECOMMENDATIONS    I was able to meet with Ms. Holt and her  after the testing session, to discuss results and recommendations.     1. Descriptions of slowed movements, sleeping a lot, and low mood/high irritability could potentially relate to side effects of Keppra. She should keep working with Dr. Frazier on potential adjustments.   2. It could also be reasonable to consider a psychiatric medication for the mood and irritability issues.   3. Studies on medications like donepezil in patients with developmental disability have not shown a benefit. The primary approaches to slowing cognitive changes are to manage other comorbid conditions, stay physically active, and optimize sleep quality.   4. She is in an appropriate living situation and receiving daily support. Staff at her home should continue to help her stay active and engaged with a variety of enjoyable activities.   5. A broad neuropsychological reevaluation is probably not needed unless something markedly changes. It would be reasonable to see her again in the next 18-24 months, for just a brief visit to gather data with the TSI, DSQIID, and a few other items.     Rich Selby, PhD, LP, ABPP  Board Certified in Clinical Neuropsychology  Licensed Psychologist [id number]    Time spent: 37 minutes neurobehavioral status exam including interview, clinical assessment by licensed and board-certified neuropsychologist (CPT 56088, 78926). 154 minutes neuropsychological testing evaluation by licensed and board-certified neuropsychologist, including integration of patient data, interpretation of standardized test results and clinical data,  clinical decision-making, treatment planning, report, and interactive feedback to the patient (CPT 24201, 31610). 16 minutes of psychological and neuropsychological test administration and scoring by licensed and board-certified neuropsychologist (CPT 55059, 60223). 80 minutes of psychological and neuropsychological test administration and scoring by technician (CPT 40419, 19142). Diagnoses: F70, F03.918, G40.919

## 2023-02-13 NOTE — LETTER
2023       RE: Kindra Holt  [address] [place] [id number]     Dear Colleague,    Thank you for referring your patient, Kindra Holt, to the [facility] at [facility]. Please see a copy of my visit note below.    Patient was seen for neuropsychological evaluation at the request of Dr. Iggy Leslie, for the purposes of diagnostic clarification and treatment planning.  1 hrs 20 min of test administration and scoring were provided by this writer, Angelica Galvez.  Please see Dr. Rich Selby's report for a full interpretation of the findings.      Adult Neuropsychology Clinic  [facility]      NEUROPSYCHOLOGICAL EVALUATION    RELEVANT HISTORY AND REASON FOR REFERRAL    This is a report of neuropsychological consultation regarding Kindra Holt, a 77-year-old year old, right handed woman with a history of unspecified intellectual/developmental disability and 8 years of formal education. Relevant history also includes epilepsy that began at age 65, hypothyroidism, type-2 diabetes, hypertension, hyperlipidemia, and stage-3 chronic kidney disease. She was seen for a baseline evaluation by my colleague, Dr. Rajat Hutn, on 3/9/2022, given concerns of cognitive decline over several years coming from staff at her group home. She was more forgetful and confused than usual, showed abnormal communication and language struggles, was grumpier and more frustrated, was impulsive, and had recurrent visual and auditory hallucinations of a  cousin. Dr. Hunt s data showed global cognitive deficits consistent with developmental delays but pronounced abnormalities in memory and executive functioning. He felt there was likely a decline from baseline. She follows with Dr. Deneen Frazier for seizure management. She started seeing Dr. Jamal Leslie in the memory clinic in April and May 2022. Dr. Leslie referred her for this reevaluation to  assess stability versus change in her condition, as an aid in diagnosis and treatment planning.     Reported early history includes developmental delays noticed since infancy. She left school after 8th grade. She lived with her parents for many years, then with a sister, a brother, and a niece. Her niece is now guardian. Reported family history includes a brain tumor for her father. Otherwise, there is no known history of neurological syndromes.     Ms. Holt is accompanied today by the manager from her group home, Alexsandra, who was also with her at last year s visit. Alexsandra reports that Ms. Holt appears more tired, more frequently makes nonsensical statements or shows unusual word-finding lapses, is more frequently confused, and notably more crabby. Sometimes, she displays remarkably clear recollections.     She has been living in the group home since 2019. The move was prompted by safety concerns. She had been living semi-independently with family and PCA support. At the home, she requires assistance with bathing, brushing teeth, medications, and meals. Her hygiene has worsened. She often tells staff she just took a shower, but it turns out it has been several days since bathing.     Over the last few months, there have been mood swings, isolating, and being rude/bossy to other residents. They have moved her to an upstairs room to be closer to staff observation. A new resident joined the home last September, and Ms. Holt apparently dislikes her.     There was a question of Keppra potentially being related to the irritability, and Dr. Frazier has advised shifting to Vimpat. That has not yet happened. Dr. Frazier s notes have excellent histories and characterizations of seizure types. As noted, seizures began at age 65. She has GTCs a few times per year. The last was in the summer. Staring spells are observed multiple times per day, a few days per week.     There was a questionable TIA in 2020 for slurred  speech. There has never been a stroke event.     Whether or not she hears things clearly is hit and miss, but staff do not have clear indications of reduced hearing acuity. No other sensory abnormalities are endorsed.     Dr. Leslie s physical exam did not indicate parkinsonism. She has not had any falls. Alexsandra reports that her walking has slowed noticeably. At baseline, she was  a powerhouse  and they had trouble keeping up with her on group outings. Now, she lags behind and cannot seem to keep up.     Daytime energy levels are lower than usual, but she is still very active. She goes on outings and/or supported employment shifts 4 days per week. When she gets home, she tends to go straight to bed. They are planning to cut back to 3 days per week to not exhaust her.     Overnight sleep is on the order of 11-12 hours, longer on days without outings. There have been no indications of REM sleep behaviors.     She continues to have hallucinations centering on her cousin, Jerod. The occur only once in a while or not too often.     Her blood sugar checks have been running higher than usual. The last A1c was 7.2.     She does not use alcohol, tobacco, or illicit drugs.     Current medications are Keppra, Januvia, Trulicity, metformin, levothyroxine, lisinopril, simvastatin, 325 mg aspirin, multivitamin, D3, and simethicone.     BEHAVIORAL OBSERVATIONS    Ms. Holt was polite and cooperative with the evaluation. She was alert and not somnolent. She was markedly perseverative and unable to provide much meaningful history. Speech was characterized by mild dysarthria, consistent with expectations for a developmental speech impediment. Motor abnormalities were not observed. She needed simplifications of test instructions. She tended to give up but would then continue appropriately with encouragement. She persisted well with requested procedures. The data are seen as valid estimates of her cognitive status.     MEASURES  ADMINISTERED    In addition to conducting a clinical interview, I directly administered the following measures:     Test for Severe Impairment; Clock Drawing; Dementia Screening Questionnaire for Individuals with Intellectual Disabilities (completed by her ).     The following measures were administered by a trained psychometrist, under my supervision:    Orientation: Time, Place, Basic Personal Information, Recent [place] Presidents; Wechsler Adult Intelligence Scales-IV: Similarities, Information, Block Design, Digit Span, Coding; Controlled Oral Word Association Test; Animal Naming Test; [place] Naming Test; Crystal Visual Acuity Screen; Finger Tapping; Trail Making Test; Stroop Test; Valles Verbal Learning Test, Revised.    RESULTS AND INTERPRETATION    Collateral Report on Functioning: Her  endorsed 22 new or worsening problems in daily functioning on the DSQIID. This is beyond the cutoff for inferring a clinically significant deficit.     Global Screening: Her performance on the TSI was 19/24. She lost points for ideomotor praxis, general fund of knowledge, conceptualization, and recent memory.     Orientation: Orientation to time was mildly below normal expectations. Orientation to place was limited to the city. Orientation to basic personal information was normal. She was unable to name the current or any other [place] presidents.    Language & Related Skills: Abstract verbal analogical reasoning was exceptionally low. Demonstrating general fund of information was exceptionally low. Confrontation naming was exceptionally low. Letter-based verbal fluency was below average. Category-based verbal fluency was low average.    Visual Perceptual & Constructional Skills: Binocular, corrected, near-point visual acuity was 20/30 on Crystal screening. Visuospatial reasoning through hands-on object assembly was exceptionally low. She was unable to make any responses for drawing a clock. She  was unable to accurately copy a pre-drawn clock.     Motor Skills: Speeded finger tapping was below average bilaterally, with no major difference between hands.     Mental Speed & Executive Functioning: Cognitive processing speed was below average on a timed transcription task. Visual scanning and graphomotor sequencing under simple conditions was below average for speed but performed without error. Scanning and sequencing under greater executive demands to control divided attention was so low she could not complete the task. Speeded word reading was below average. Speeded color naming was below average. Speeded response inhibition was below average.     Attention & Working Memory: Immediate auditory attention and working memory were exceptionally low for repeating and rearranging digit strings.    Learning & Anterograde Memory: As noted, she had errors on memory items on the TSI. Learning a word list over repeated readings was exceptionally low. Delayed free recall of the list was exceptionally low (zero recall), and delayed recognition of the list was exceptionally low (blanket endorsed all options, including all non-list foils).     IMPRESSIONS    The neuropsychological results are abnormal. Compared to testing 11 months ago, there has not been a marked decline. Most scores are stable within test-retest variability. Ms. Holt continues to display global impairment. The data do not suggest focal or lateralized cerebral dysfunction. There is diffuse dysfunction. I do believe that she has declined cognitively from her lifelong baseline in recent years, but there has not been any sort of precipitous cognitive decline since last year s evaluation.     In the setting of already low cognitive baselines, it is hard to measure decline. Signs of an additional dementia process are typically more observable in emotional and behavioral matters. Her , who has known her well for 4 years, reports  substantial changes in mood, temperament, daily living skills, and overall functional capacity. Information from interview and the DSQIID do suggest a dementia syndrome on top of the developmental disability. Epilepsy likely contributes, as does her advanced age. Alzheimer s pathology cannot be ruled out.     RECOMMENDATIONS    I was able to meet with Ms. Holt and her  after the testing session, to discuss results and recommendations.     1. Descriptions of slowed movements, sleeping a lot, and low mood/high irritability could potentially relate to side effects of Keppra. She should keep working with Dr. Frazier on potential adjustments.   2. It could also be reasonable to consider a psychiatric medication for the mood and irritability issues.   3. Studies on medications like donepezil in patients with developmental disability have not shown a benefit. The primary approaches to slowing cognitive changes are to manage other comorbid conditions, stay physically active, and optimize sleep quality.   4. She is in an appropriate living situation and receiving daily support. Staff at her home should continue to help her stay active and engaged with a variety of enjoyable activities.   5. A broad neuropsychological reevaluation is probably not needed unless something markedly changes. It would be reasonable to see her again in the next 18-24 months, for just a brief visit to gather data with the TSI, DSQIID, and a few other items.     Rich Selby, PhD, LP, ABPP  Board Certified in Clinical Neuropsychology  Licensed Psychologist [id number]      Time spent: 37 minutes neurobehavioral status exam including interview, clinical assessment by licensed and board-certified neuropsychologist (CPT 91674, [id number]). 154 minutes neuropsychological testing evaluation by licensed and board-certified neuropsychologist, including integration of patient data, interpretation of standardized test results and clinical  data, clinical decision-making, treatment planning, report, and interactive feedback to the patient (CPT 95977, [id number]). 16 minutes of psychological and neuropsychological test administration and scoring by licensed and board-certified neuropsychologist (CPT 26749, [id number]). 80 minutes of psychological and neuropsychological test administration and scoring by technician (CPT 13387, [id number]). Diagnoses: F70, F03.918, G40.919

## 2023-02-13 NOTE — PROGRESS NOTES
Patient was seen for neuropsychological evaluation at the request of Dr. Iggy Leslie, for the purposes of diagnostic clarification and treatment planning.  1 hrs 20 min of test administration and scoring were provided by this writer, Angelica Galvez.  Please see Dr. Rich Selby's report for a full interpretation of the findings.

## 2023-02-17 NOTE — PROGRESS NOTES
Name: Kindra Holt MRN: [id number]  : 1945  AGGARWAL: 2023  Staff: [facility] Tech: LUZ MARINA Age: 77  Sex: Female Hand: Right Educ: 8  Vision: 20/30 ?with correction / ?without correction    TSI     Total:     DSQIID ()     Total: 22    ORIENTATION     Time  --5     Place       Personal info          Presidents     WAIS-IV   Raw SSa     Similarities  7 3     Information  2 3     Block Design  4 2     Digit Span  6 1      Coding  19 5    COWAT (CFL)     Raw: 13  SS: 5 %ile: 3-5    ANIMAL NAMING TEST     Raw: 11  SS: 5 T: 38    [place] NAMING TEST     Raw: 11  SS: 2 %ile: <1    CLOCK DRAWING: Very Impaired    FINGER TAPPING   Avg  SS T     RH  16.0 0 32     LH 20.0 0 30     TRAILS Raw  Err SS %ile     A 99  0 4 2      B Unable      STROOP TEST   Raw SS %ile      Word 50 5 3-5     Color  38 5 3-5     C/W  14 5 3-5    HVLT-R From 4     Trial 1 2 3      1 3 4  Raw T     Total Learning (1-3) 8 22     Delayed Recall  0 <20     Percent Retention 0 <20     Recognition Hits/FP  <20

## 2023-02-17 NOTE — PROGRESS NOTES
Adult Neuropsychology Clinic  [facility]      NEUROPSYCHOLOGICAL EVALUATION    RELEVANT HISTORY AND REASON FOR REFERRAL    This is a report of neuropsychological consultation regarding Kindra Holt, a 77-year-old year old, right handed woman with a history of unspecified intellectual/developmental disability and 8 years of formal education. Relevant history also includes epilepsy that began at age 65, hypothyroidism, type-2 diabetes, hypertension, hyperlipidemia, and stage-3 chronic kidney disease. She was seen for a baseline evaluation by my colleague, Dr. Rajat Hunt, on 3/9/2022, given concerns of cognitive decline over several years coming from staff at her group home. She was more forgetful and confused than usual, showed abnormal communication and language struggles, was grumpier and more frustrated, was impulsive, and had recurrent visual and auditory hallucinations of a  cousin. Dr. Hunt s data showed global cognitive deficits consistent with developmental delays but pronounced abnormalities in memory and executive functioning. He felt there was likely a decline from baseline. She follows with Dr. Deneen Frazier for seizure management. She started seeing Dr. Jamal Leslie in the memory clinic in April and May 2022. Dr. Leslie referred her for this reevaluation to assess stability versus change in her condition, as an aid in diagnosis and treatment planning.     Reported early history includes developmental delays noticed since infancy. She left school after 8th grade. She lived with her parents for many years, then with a sister, a brother, and a niece. Her niece is now guardian. Reported family history includes a brain tumor for her father. Otherwise, there is no known history of neurological syndromes.     Ms. Holt is accompanied today by the manager from her group home, Alexsandra, who was also with her at last year s visit. Alexsandra reports that Ms. Holt appears more tired, more  frequently makes nonsensical statements or shows unusual word-finding lapses, is more frequently confused, and notably more crabby. Sometimes, she displays remarkably clear recollections.     She has been living in the group home since 2019. The move was prompted by safety concerns. She had been living semi-independently with family and PCA support. At the home, she requires assistance with bathing, brushing teeth, medications, and meals. Her hygiene has worsened. She often tells staff she just took a shower, but it turns out it has been several days since bathing.     Over the last few months, there have been mood swings, isolating, and being rude/bossy to other residents. They have moved her to an upstairs room to be closer to staff observation. A new resident joined the home last September, and Ms. Holt apparently dislikes her.     There was a question of Keppra potentially being related to the irritability, and Dr. Frazier has advised shifting to Vimpat. That has not yet happened. Dr. Frazier s notes have excellent histories and characterizations of seizure types. As noted, seizures began at age 65. She has GTCs a few times per year. The last was in the summer. Staring spells are observed multiple times per day, a few days per week.     There was a questionable TIA in 2020 for slurred speech. There has never been a stroke event.     Whether or not she hears things clearly is hit and miss, but staff do not have clear indications of reduced hearing acuity. No other sensory abnormalities are endorsed.     Dr. Leslie s physical exam did not indicate parkinsonism. She has not had any falls. Alexsandra reports that her walking has slowed noticeably. At baseline, she was  a powerhouse  and they had trouble keeping up with her on group outings. Now, she lags behind and cannot seem to keep up.     Daytime energy levels are lower than usual, but she is still very active. She goes on outings and/or supported employment shifts  4 days per week. When she gets home, she tends to go straight to bed. They are planning to cut back to 3 days per week to not exhaust her.     Overnight sleep is on the order of 11-12 hours, longer on days without outings. There have been no indications of REM sleep behaviors.     She continues to have hallucinations centering on her cousin, Jerod. The occur only once in a while or not too often.     Her blood sugar checks have been running higher than usual. The last A1c was 7.2.     She does not use alcohol, tobacco, or illicit drugs.     Current medications are Keppra, Januvia, Trulicity, metformin, levothyroxine, lisinopril, simvastatin, 325 mg aspirin, multivitamin, D3, and simethicone.     BEHAVIORAL OBSERVATIONS    Ms. Holt was polite and cooperative with the evaluation. She was alert and not somnolent. She was markedly perseverative and unable to provide much meaningful history. Speech was characterized by mild dysarthria, consistent with expectations for a developmental speech impediment. Motor abnormalities were not observed. She needed simplifications of test instructions. She tended to give up but would then continue appropriately with encouragement. She persisted well with requested procedures. The data are seen as valid estimates of her cognitive status.     MEASURES ADMINISTERED    In addition to conducting a clinical interview, I directly administered the following measures:     Test for Severe Impairment; Clock Drawing; Dementia Screening Questionnaire for Individuals with Intellectual Disabilities (completed by her ).     The following measures were administered by a trained psychometrist, under my supervision:    Orientation: Time, Place, Basic Personal Information, Recent [place] Presidents; Wechsler Adult Intelligence Scales-IV: Similarities, Information, Block Design, Digit Span, Coding; Controlled Oral Word Association Test; Animal Naming Test; [place] Naming Test; Crystal  Visual Acuity Screen; Finger Tapping; Trail Making Test; Stroop Test; Valles Verbal Learning Test, Revised.    RESULTS AND INTERPRETATION    Collateral Report on Functioning: Her  endorsed 22 new or worsening problems in daily functioning on the DSQIID. This is beyond the cutoff for inferring a clinically significant deficit.     Global Screening: Her performance on the TSI was 19/24. She lost points for ideomotor praxis, general fund of knowledge, conceptualization, and recent memory.     Orientation: Orientation to time was mildly below normal expectations. Orientation to place was limited to the city. Orientation to basic personal information was normal. She was unable to name the current or any other [place] presidents.    Language & Related Skills: Abstract verbal analogical reasoning was exceptionally low. Demonstrating general fund of information was exceptionally low. Confrontation naming was exceptionally low. Letter-based verbal fluency was below average. Category-based verbal fluency was low average.    Visual Perceptual & Constructional Skills: Binocular, corrected, near-point visual acuity was 20/30 on Crystal screening. Visuospatial reasoning through hands-on object assembly was exceptionally low. She was unable to make any responses for drawing a clock. She was unable to accurately copy a pre-drawn clock.     Motor Skills: Speeded finger tapping was below average bilaterally, with no major difference between hands.     Mental Speed & Executive Functioning: Cognitive processing speed was below average on a timed transcription task. Visual scanning and graphomotor sequencing under simple conditions was below average for speed but performed without error. Scanning and sequencing under greater executive demands to control divided attention was so low she could not complete the task. Speeded word reading was below average. Speeded color naming was below average. Speeded response inhibition  was below average.     Attention & Working Memory: Immediate auditory attention and working memory were exceptionally low for repeating and rearranging digit strings.    Learning & Anterograde Memory: As noted, she had errors on memory items on the TSI. Learning a word list over repeated readings was exceptionally low. Delayed free recall of the list was exceptionally low (zero recall), and delayed recognition of the list was exceptionally low (blanket endorsed all options, including all non-list foils).     IMPRESSIONS    The neuropsychological results are abnormal. Compared to testing 11 months ago, there has not been a marked decline. Most scores are stable within test-retest variability. Ms. Holt continues to display global impairment. The data do not suggest focal or lateralized cerebral dysfunction. There is diffuse dysfunction. I do believe that she has declined cognitively from her lifelong baseline in recent years, but there has not been any sort of precipitous cognitive decline since last year s evaluation.     In the setting of already low cognitive baselines, it is hard to measure decline. Signs of an additional dementia process are typically more observable in emotional and behavioral matters. Her , who has known her well for 4 years, reports substantial changes in mood, temperament, daily living skills, and overall functional capacity. Information from interview and the DSQIID do suggest a dementia syndrome on top of the developmental disability. Epilepsy likely contributes, as does her advanced age. Alzheimer s pathology cannot be ruled out.     RECOMMENDATIONS    I was able to meet with Ms. Holt and her  after the testing session, to discuss results and recommendations.     1. Descriptions of slowed movements, sleeping a lot, and low mood/high irritability could potentially relate to side effects of Keppra. She should keep working with Dr. Frazier on  potential adjustments.   2. It could also be reasonable to consider a psychiatric medication for the mood and irritability issues.   3. Studies on medications like donepezil in patients with developmental disability have not shown a benefit. The primary approaches to slowing cognitive changes are to manage other comorbid conditions, stay physically active, and optimize sleep quality.   4. She is in an appropriate living situation and receiving daily support. Staff at her home should continue to help her stay active and engaged with a variety of enjoyable activities.   5. A broad neuropsychological reevaluation is probably not needed unless something markedly changes. It would be reasonable to see her again in the next 18-24 months, for just a brief visit to gather data with the TSI, DSQIID, and a few other items.     Rich Selby, PhD, LP, ABPP  Board Certified in Clinical Neuropsychology  Licensed Psychologist [id number]      Time spent: 37 minutes neurobehavioral status exam including interview, clinical assessment by licensed and board-certified neuropsychologist (CPT 07841, 51615). 154 minutes neuropsychological testing evaluation by licensed and board-certified neuropsychologist, including integration of patient data, interpretation of standardized test results and clinical data, clinical decision-making, treatment planning, report, and interactive feedback to the patient (CPT 53961, 69626). 16 minutes of psychological and neuropsychological test administration and scoring by licensed and board-certified neuropsychologist (CPT 91906, 08242). 80 minutes of psychological and neuropsychological test administration and scoring by technician (CPT 87667, 25368). Diagnoses: F70, F03.918, G40.919

## 2023-05-08 ENCOUNTER — HEALTH MAINTENANCE LETTER (OUTPATIENT)
Age: 78
End: 2023-05-08

## 2023-05-15 ENCOUNTER — VIRTUAL VISIT (OUTPATIENT)
Dept: NEUROLOGY | Facility: CLINIC | Age: 78
End: 2023-05-15
Payer: COMMERCIAL

## 2023-05-15 VITALS — WEIGHT: 166.9 LBS | BODY MASS INDEX: 31.51 KG/M2 | HEIGHT: 61 IN

## 2023-05-15 DIAGNOSIS — G40.919 INTRACTABLE EPILEPSY WITHOUT STATUS EPILEPTICUS, UNSPECIFIED EPILEPSY TYPE (H): ICD-10-CM

## 2023-05-15 RX ORDER — DULAGLUTIDE 3 MG/.5ML
4.5 INJECTION, SOLUTION SUBCUTANEOUS
COMMUNITY
Start: 2023-04-24

## 2023-05-15 RX ORDER — LEVETIRACETAM 1000 MG/1
TABLET ORAL
Qty: 135 TABLET | Refills: 3 | Status: SHIPPED | OUTPATIENT
Start: 2023-05-15 | End: 2024-07-02

## 2023-05-15 RX ORDER — LEVOTHYROXINE SODIUM 75 UG/1
1 TABLET ORAL DAILY
COMMUNITY
Start: 2023-04-24

## 2023-05-15 ASSESSMENT — PAIN SCALES - GENERAL: PAINLEVEL: NO PAIN (0)

## 2023-05-15 NOTE — PROGRESS NOTES
"Virtual Visit Details    Type of service:  Video Visit   Video Start Time: 2:56 PM  Video End Time:3:08 PM    Originating Location (pt. Location): Home    Distant Location (provider location):  Off-site  Platform used for Video Visit: Mar [facility] Epilepsy Care Progress Note     Patient:  Kindra Hlot  :  1945   Age:  77 year old   Today's Office Visit: 5/15/2023     HPI:   Joined with staff member Goyo. Overall she is well. She is nervous about sun exposure. The patient was last seen on 2022. At that last visit, the patient and caregiver were advised to start Vimpat. However, they state they did not start this and were not aware they had to start it.      The patient recently completed repeat Neuropsychology testing (2023) which continued to note global cognitive impairment. While the patient appears to have declined over the past few years, her testing compared from 11mo prior was stable. Per Christiana she \"says things that are random and not true\". She a memory specialist appointment in 2023.   Her mood has stables. She does interact with housemates. No physical outburst (no hitting, no biting, and no throwing).     Last spell 2022 (choked and appeared confused) and  2022 (she was vomiting, confused, repeats uhmm, she was being weaned off oxcarbazepine).  Caregiver denies dizziness, no double vision, no nausea, no vomiting, no abdominal pain, no mood changes, no ER visits, no hospitalizations.  On this visit we spoke about patient's seizures, antiepileptic drug, and plan of epilepsy are. Patient/caregiver was agreeable with plan of care.      Spell Type 1: These are described as  \"odd spells, vomited several times, dud not respond, eyes were closed, increased drooling, very confused, did not follow commands, she is not able remember how to turn on faucet, speech slurred, stiffens\".  No clear warning sign. Prior to 2020 she had these spells daily.  Last " "spell was 11/2022, 2/2022 and prior to this September 2021.     Current antiepileptic drug:   Levetiracetam 500 mg morning and 1000 mg at night       Past antiepileptic drug-   - Lamotrigine caused rash - stopped 7/2020 she developed lamotrigine induced maculopapular rash in sun exposed areas (face/neck/forearm) with itching, no bleeding or ulceration noted. After stopping lamotrigine her rash resolved.   - Oxcarbazepine cause severe mood issues in 12/2021 150 mg morning-300 mg twice a day: \"aggression, defiant, feisty, hallucinating, mean, more flashbacks, accusing housemates, hitting house mate, very agitated\". Weaned off oxcarbazepine.      Exam:   There were no vitals taken for this visit.    Wt Readings from Last 4 Encounters:   12/26/22 76.2 kg (168 lb)   04/22/22 77 kg (169 lb 12.8 oz)     Neuro: Alert, orientated, speech is fluent, face is symmetric, extra-ocular movement in tact, no focal deficits noted. 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  Last focal seizures with impairment in awareness  8/2021. In regards to antiseizure medication, she is very sensitive to antiepileptic drug she had lamotrigine induced sun sensitivity rash rarely and aggression with oxcarbazepine  (very sensitive to antiepileptic drugs). We have not been able to transition her to another antiepileptic drug. We will continue " levetiracetam 500-1000 (most seizure are early morning). They did not start lacosamide. She is stable, so, we will not lacosamide.     Plan:   1. Continue Levetiracetam 500 mg morning and 1000 mg night  2. Follow up  6 months     Deneen Frazier MD

## 2023-05-15 NOTE — LETTER
"5/15/2023       RE: Kindra Holt  : 1945   MRN: [id number]        Dear Colleague,    Thank you for referring your patient, Kindra Holt, to the [facility] EPILEPSY CARE at [facility]. Please see a copy of my visit note below.      [facility] Epilepsy Care Progress Note     Patient:  Kindra Holt  :  1945   Age:  77 year old   Today's Office Visit: 5/15/2023     HPI:   Joined with staff member Goyo. Overall she is well. She is nervous about sun exposure. The patient was last seen on 2022. At that last visit, the patient and caregiver were advised to start Vimpat. However, they state they did not start this and were not aware they had to start it.      The patient recently completed repeat Neuropsychology testing (2023) which continued to note global cognitive impairment. While the patient appears to have declined over the past few years, her testing compared from 11mo prior was stable. Per Christiana she \"says things that are random and not true\". She a memory specialist appointment in 2023.   Her mood has stables. She does interact with housemates. No physical outburst (no hitting, no biting, and no throwing).     Last spell 2022 (choked and appeared confused) and  2022 (she was vomiting, confused, repeats uhmm, she was being weaned off oxcarbazepine).  Caregiver denies dizziness, no double vision, no nausea, no vomiting, no abdominal pain, no mood changes, no ER visits, no hospitalizations.  On this visit we spoke about patient's seizures, antiepileptic drug, and plan of epilepsy are. Patient/caregiver was agreeable with plan of care.      Spell Type 1: These are described as  \"odd spells, vomited several times, dud not respond, eyes were closed, increased drooling, very confused, did not follow commands, she is not able remember how to turn on faucet, speech slurred, stiffens\".  No clear warning sign. Prior to " "June 2020 she had these spells daily.  Last spell was 11/2022, 2/2022 and prior to this September 2021.     Current antiepileptic drug:   Levetiracetam 500 mg morning and 1000 mg at night       Past antiepileptic drug-   - Lamotrigine caused rash - stopped 7/2020 she developed lamotrigine induced maculopapular rash in sun exposed areas (face/neck/forearm) with itching, no bleeding or ulceration noted. After stopping lamotrigine her rash resolved.   - Oxcarbazepine cause severe mood issues in 12/2021 150 mg morning-300 mg twice a day: \"aggression, defiant, feisty, hallucinating, mean, more flashbacks, accusing housemates, hitting house mate, very agitated\". Weaned off oxcarbazepine.      Exam:   There were no vitals taken for this visit.    Wt Readings from Last 4 Encounters:   12/26/22 76.2 kg (168 lb)   04/22/22 77 kg (169 lb 12.8 oz)     Neuro: Alert, orientated, speech is fluent, face is symmetric, extra-ocular movement in tact, no focal deficits noted. 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  Last focal seizures with impairment in awareness  8/2021. In regards to antiseizure medication, she is very sensitive to antiepileptic drug she had lamotrigine induced sun sensitivity rash rarely and aggression with oxcarbazepine  (very sensitive to antiepileptic drugs). We have not been able to transition her to another " antiepileptic drug. We will continue levetiracetam 500-1000 (most seizure are early morning). They did not start lacosamide. She is stable, so, we will not lacosamide.     Plan:   1. Continue Levetiracetam 500 mg morning and 1000 mg night  2. Follow up  6 months           Again, thank you for allowing me to participate in the care of your patient.      Sincerely,    Deneen Frazier MD

## 2023-06-07 ENCOUNTER — VIRTUAL VISIT (OUTPATIENT)
Dept: NEUROLOGY | Facility: CLINIC | Age: 78
End: 2023-06-07
Payer: COMMERCIAL

## 2023-06-07 VITALS — BODY MASS INDEX: 30.55 KG/M2 | WEIGHT: 166 LBS | HEIGHT: 62 IN

## 2023-06-07 DIAGNOSIS — F02.80 ALZHEIMER'S DISEASE (H): Primary | ICD-10-CM

## 2023-06-07 DIAGNOSIS — G30.9 ALZHEIMER'S DISEASE (H): Primary | ICD-10-CM

## 2023-06-07 RX ORDER — DONEPEZIL HYDROCHLORIDE 5 MG/1
5 TABLET, FILM COATED ORAL AT BEDTIME
Qty: 90 TABLET | Refills: 4 | Status: SHIPPED | OUTPATIENT
Start: 2023-06-07 | End: 2024-07-11

## 2023-06-07 ASSESSMENT — PATIENT HEALTH QUESTIONNAIRE - PHQ9: SUM OF ALL RESPONSES TO PHQ QUESTIONS 1-9: 0

## 2023-06-07 ASSESSMENT — PAIN SCALES - GENERAL: PAINLEVEL: NO PAIN (0)

## 2023-06-07 NOTE — PROGRESS NOTES
"CC: RECHECK      Prior History:  Kindra Holt is a 77 year old female who presents virtually today for a follow-up visit. Please see my last encounter dated 5/25/22 and the initial encounter dated 4/22/22 for a full review of the patient's history. In summary Ms. Holt has a past medical history of epilepsy (first seizures occurring in her 60s, focal onset with secondary generalization; managed with Keppra under the excellent care of Dr. Frazier), mild cognitive impairment and lifelong intellectual disability, hypothyroidism, stage III CKD, HTN, HLD, and type II  diabetes. There is also a questionable history of TIA (2020), patient was symptomatic with slurred speech but MRI showed no changes. She initially presented to the memory clinic for evaluation of persistent, non-paroxysmal memory concerns with visions of \"Jerod\", made-up stories, and inaccurately mixing together events/reports. She scored 14/30 on MMSE. She saw Dr. Hunt on March 3, [id number] who noted global brain dysfunction with a predilection for medial temporal, frontal and right hemispheric functioning.  He labs were normal other than known CKD (eGFR of 52). MRI was normal and did not show any atrophy beyond what would be expected for someone her age. Her clinical picture is confounded by the patient's seizure disorder and history of intellectual disability. During our last appointment we discussed obtaining an FDG-PET scan vs a watchful waiting approach with repeat neuropsychological testing in one year. She opted for the latter plan.        Updates From This Visit:   Today, the patient presents with Alexsandra who is an independent historian and provides collateral information included below.     Kindra has deteriorated both functionally and behaviorally. She runs off during museum tours. She tells stories that are \"90%\" untrue. It was discovered that her brother has similar problems -- he asks the same question over-and-over. Kindra had one other " sister who is . She passed away Please see the documentation below including Group Therapy Recordshart messages for details.    No dream enactment behavior.    Her last sodium corrected to 139.    We discussed her possible visualization of Jerod. These seem to stopped and she has no other instances of known visual hallucinations.    Review of Psychotropic and High-Risk Medications:  Keppra  Benadryl 25 mg PRN    Current Outpatient Medications   Medication     aspirin - buffered (ASCRIPTIN) 325 MG TABS tablet     cholecalciferol (VITAMIN D3) 25 mcg (1000 units) capsule     diphenhydrAMINE (BENADRYL) 25 MG capsule     dulaglutide (TRULICITY) 1.5 MG/0.5ML pen     Dulaglutide (TRULICITY) 3 MG/0.5ML SOPN     furosemide (LASIX) 40 MG tablet     ketoconazole (NIZORAL) 2 % external cream     levETIRAcetam (KEPPRA) 1000 MG tablet     levothyroxine (SYNTHROID/LEVOTHROID) 75 MCG tablet     lisinopril (ZESTRIL) 40 MG tablet     metFORMIN (GLUCOPHAGE) 500 MG tablet     multivitamin w/minerals (THERA-VIT-M) tablet     simethicone (MYLICON) 80 MG chewable tablet     simvastatin (ZOCOR) 20 MG tablet     sitagliptin (JANUVIA) 50 MG tablet     triamcinolone (KENALOG) 0.1 % external ointment     UNABLE TO FIND     Current Facility-Administered Medications   Medication     hydrocortisone 2.5 % cream         Exam:  Neuro: Alert, awake, drinking large 1 liter cup of fluid.    I reviewed new external records since their last visit, which show visits with Dr. Frazier, an ED visit in 2022 for a seizure after being out in the heat, visits with endocrinology for management of CKD, visits with podiatry for management of toenail fungus, dermatology visit, and repeat neuropsychological testing with Dr. Selby (results below). Records also include the following [employer] message from family dated 22:   Just wanted to update you about Kindra's health and mood swings  she has had in the last couple of months. Kindra has been isolating herself in her  "room a lot in the last couple of months. This is very different from her always wanting to be with everyone in the common areas. She is also not wanting to join activities with friends when we have our Friday nights get together lately, or is rude to everyone and asks to go home right away. And when she does join with activities or her housemates out in the common areas, Kindra is very rude, grumpy, and bossy toward everyone. Kindra has had very high blood sugar levels that we are trying to get down, but our normal drinking water and exercising is not working. Kindra did have covid just before Thanksgiving, and had a UTI in the last couple of weeks. Kindra is making up a lot of stories, and is seeming more confused at times. This is getting to the point that Kindra's housemates are asking why Kindra does not join the house anymore, or why she is so grumpy. When Kindra is in her room, she looks at books or sleeps. Mostly sleeping. She just doesn't seem to enjoy anything or even happy anymore    We have been noticing that Kindra has been forgetting things, and getting confused at times more often. I even had her day program  pull me to the side after dropping off Kindra to tell me that Kindra was very \"off\" and confused about things she does, a couple of times last week. Kindra has also been doing the same thing over and over again because she forgot that she has already done the project like making her lunch for the next day, and remade it three times. Or will say things like\" we can't talk about the dogs running on the highway anymore, that isn't my problem.\" It is very random things like that, and it is confusing staff when they try to ask more about what she was talking about.     I reviewed new lab results since their last visit, which include:      Ref Range & Units 4/17/23   Sodium 135 - 144 mmol/L 139    Potassium 3.4 - 5.1 mmol/L 4.6    Chloride 98 - 107 mmol/L 94 Low     Carbon Dioxide 22 - 30 mmol/L 32 High   "   Calcium 8.6 - 10.3 mg/dL 9.6    Glucose 74 - 100 mg/dL 155 High     Blood Urea nitrogen 7 - 17 mg/dL 22 High     Creatinine 0.52 - 1.04 mg/dL 1.17 High     Anion Gap 5 - 15 mmol/L 13    Glomerular Filtration Rate >60 mL/min/1.73 m*2 47      Hemoglobin A1C 4.8 - 5.6 % 7.9 High       Thyroid Stimulating hormone 0.47 - 4.68 mIU/L 5.05 High       T3, TOTAL 97 - 169 ng/dL 126      T4, Free 0.78 - 2.19 ng/dL 1.28        URINALYSIS, REFLEX TO MICROSCOPIC   Ref Range & Units 3/13/23   UA Color Light Yellow, Yellow Yellow    Urine Appearance Clear Clear    Urine Specific Gravity 1.005 - 1.030 1.005    Urine pH 5.0 - 8.0 6.0    Urine Leukocyte Esterase Negative 2+ Abnormal     Urine Nitrates Negative Negative    Urine Protein Negative Trace Abnormal     Urine Glucose Negative Negative    Urine Ketones Negative Negative    Urine Urobilinogen Normal Normal    Urine Bilirubin Negative Negative    Urine Blood Negative 1+ Abnormal        Ref Range & Units 3/13/23   WBC 4.0 - 11.0 X10*3u/L 8.0    RBC 3.80 - 5.20 X10*6/uL 4.72    HGB 12.0 - 16.0 g/dl 13.4    HCT 35.0 - 47.0 % 41.5    MCV 80 - 98 fL 87.9    MCH 27.0 - 34.0 pg 28.4    MCHC 32 - 36 g/dl 32.3     - 420 X10*3/uL 301    RDW-CV 11.6 - 14.4 % 14.0    RDW-SD 36.5 - 46.3 fL 45.0       Ref Range & Units 1/11/23   Cholesterol 100 - 200 mg/dL 161    Triglycerides 35 - 150 mg/dL 251 High     HDL Cholesterol, Measured 45 - 60 mg/dL 37 Low     LDL Cholesterol, Calculated  <=129 mg/dL 74    Non-HDL Cholesterol <=130 mg/dL 124      I reviewed new imaging/Testing since their last visit, including:   Test Provider Result Date    Neuropsych Testing  Rich Selby, PhD, LP  The neuropsychological results are abnormal. Compared to testing 11 months ago, there has not been a marked decline. Most scores are stable within test-retest variability. Ms. Holt continues to display global impairment. The data do not suggest focal or lateralized cerebral dysfunction. There is diffuse  dysfunction. I do believe that she has declined cognitively from her lifelong baseline in recent years, but there has not been any sort of precipitous cognitive decline since last year s evaluation.      In the setting of already low cognitive baselines, it is hard to measure decline. Signs of an additional dementia process are typically more observable in emotional and behavioral matters. Her , who has known her well for 4 years, reports substantial changes in mood, temperament, daily living skills, and overall functional capacity. Information from interview and the DSQIID do suggest a dementia syndrome on top of the developmental disability. Epilepsy likely contributes, as does her advanced age. Alzheimer s pathology cannot be ruled out.  2/13/23       Assessment and Care Plan:   #Alzheimer's disease dementia      A straightforward assignment of dementia due to Alzheimer's disease is complicated by the baseline low intellectual functioning and epilepsy, and CKD, but overall there's been an confident informant report of behavioral, cognitive and functional decline in the past 4 years that has no other better explanation. Her MRI shows atrophy, but it is not beyond what is expected for age, which certainly does not exclude Alzheimer's disease. Her sodium has now corrected and is normalized.    We will start on donepezil 5 mg daily. If tolerated, she can increase to 10 mg daily.    1 year follow-up    Standard recommendations:  Exercise is the only known therapy that can delay cognitive decline. We recommend you perform at least 30 minutes of exercise (preferably aerobic, but other more mild forms are OK -- the main idea is to stay physically active) for 5 days a week.    Continue to optimize cerebrovascular risk factors (blood pressure control, lipid control, glycemic control, healthy diet and exercise).    Stay socially and intellectually active as much as possible. Participate in activities that  "are enjoyable to you. There is no need to perform mental quizzes or \"mind games\" to boost memory; studies have demonstrated that these specialized memory games do not actually boost the patient's cognitive performance in activities outside the game.     Eat healthy. A balanced diet with all major food groups is recommended. Other recommended diets include the Mediterranean diet.    Remove dangerous objects or fall hazards from the home environment.    The Alzheimer's Association has many resources, support groups, and FAQs for caregivers and patients living with dementia. I would highly recommend visiting their webpage: Alz.org    Community Resources:  https://[email or web address]/dementia-resources    Caregiver Support Program that helps to line up the right services for your and your loved one:  https://[facility].org/services/caregiver-assurance  Speak with an Advisor to learn how Caregiver Assurance can help you: 814-810-CARE([phone])    Caregiving reading materials  1. I recommend materials published from the NIH.   i. https://order.gemini.nih.gov/publication/caring-for-a-person-with-alzheimers-disease-your-easy-to-use-guide  ii. https://www.gemini.nih.gov/health/caregiving  iii. https://www.gemini.nih.gov/health/alzheimers/caregiving  2. Another well regarded book is The 36-Hour Day: A Family Guide to Caring for People Who Have Alzheimer Disease, Other Dementias, and Memory Loss  3. [employer] [place] is a good book that discusses the caregiver experience    Adult rehabilitative mental health services (ARMHS) is a range of services that helps an individual develop and enhance psychiatric stability, social competencies, personal and emotional adjustment, and independent living and community skills. You can find out more at:  https://[place].gov/dhs/people-we-serve/people-with-disabilities/health-care/adult-mental-health/programs-services/armhs.jsp  Or zakiya [phone]    Today, I spent 35 minutes reviewing the chart, " personally assessing objective testing, direct patient care, completing documentation and billing.

## 2023-06-07 NOTE — NURSING NOTE
Is the patient currently in the state of [place]? YES    Visit mode:VIDEO    If the visit is dropped, the patient can be reconnected by: VIDEO VISIT: Send to e-mail at: [email or web address]    Will anyone else be joining the visit? Yes, Alexsandra () will be joining.     How would you like to obtain your AVS? MyChart    Are changes needed to the allergy or medication list? NO    Reason for visit: RECHECK    Medication and allergies have been reviewed.     Ramon Monae, VF

## 2023-06-07 NOTE — PROGRESS NOTES
Virtual Visit Details    Type of service:  Video Visit   Video Start Time: 4:38 PM  Video End Time:4:58 PM    Originating Location (pt. Location): Home    Distant Location (provider location):  On-site  Platform used for Video Visit: Mar

## 2023-06-07 NOTE — LETTER
"6/7/2023       RE: Kindra Holt  [address] [place] [id number]         Dear Colleague,    Thank you for referring your patient, Kindra Holt, to the  [facility] at [facility]. Please see a copy of my visit note below.    CC: RECHECK      Prior History:  Kindra Holt is a 77 year old female who presents virtually today for a follow-up visit. Please see my last encounter dated 5/25/22 and the initial encounter dated 4/22/22 for a full review of the patient's history. In summary Ms. Holt has a past medical history of epilepsy (first seizures occurring in her 60s, focal onset with secondary generalization; managed with Keppra under the excellent care of Dr. Frazier), mild cognitive impairment and lifelong intellectual disability, hypothyroidism, stage III CKD, HTN, HLD, and type II  diabetes. There is also a questionable history of TIA (2020), patient was symptomatic with slurred speech but MRI showed no changes. She initially presented to the memory clinic for evaluation of persistent, non-paroxysmal memory concerns with visions of \"Jerod\", made-up stories, and inaccurately mixing together events/reports. She scored 14/30 on MMSE. She saw Dr. Hunt on March 3, [id number] who noted global brain dysfunction with a predilection for medial temporal, frontal and right hemispheric functioning.  He labs were normal other than known CKD (eGFR of 52). MRI was normal and did not show any atrophy beyond what would be expected for someone her age. Her clinical picture is confounded by the patient's seizure disorder and history of intellectual disability. During our last appointment we discussed obtaining an FDG-PET scan vs a watchful waiting approach with repeat neuropsychological testing in one year. She opted for the latter plan.        Updates From This Visit:   Today, the patient presents with Alexsandra who is an independent " "historian and provides collateral information included below.     Kindra has deteriorated both functionally and behaviorally. She runs off during museum tours. She tells stories that are \"90%\" untrue. It was discovered that her brother has similar problems -- he asks the same question over-and-over. Kindra had one other sister who is . She passed away Please see the documentation below including [employer] messages for details.    No dream enactment behavior.    Her last sodium corrected to 139.    We discussed her possible visualization of Jerod. These seem to stopped and she has no other instances of known visual hallucinations.    Review of Psychotropic and High-Risk Medications:  Keppra  Benadryl 25 mg PRN    Current Outpatient Medications   Medication    aspirin - buffered (ASCRIPTIN) 325 MG TABS tablet    cholecalciferol (VITAMIN D3) 25 mcg (1000 units) capsule    diphenhydrAMINE (BENADRYL) 25 MG capsule    dulaglutide (TRULICITY) 1.5 MG/0.5ML pen    Dulaglutide (TRULICITY) 3 MG/0.5ML SOPN    furosemide (LASIX) 40 MG tablet    ketoconazole (NIZORAL) 2 % external cream    levETIRAcetam (KEPPRA) 1000 MG tablet    levothyroxine (SYNTHROID/LEVOTHROID) 75 MCG tablet    lisinopril (ZESTRIL) 40 MG tablet    metFORMIN (GLUCOPHAGE) 500 MG tablet    multivitamin w/minerals (THERA-VIT-M) tablet    simethicone (MYLICON) 80 MG chewable tablet    simvastatin (ZOCOR) 20 MG tablet    sitagliptin (JANUVIA) 50 MG tablet    triamcinolone (KENALOG) 0.1 % external ointment    UNABLE TO FIND     Current Facility-Administered Medications   Medication    hydrocortisone 2.5 % cream         Exam:  Neuro: Alert, awake, drinking large 1 liter cup of fluid.    I reviewed new external records since their last visit, which show visits with Dr. Frazier, an ED visit in 2022 for a seizure after being out in the heat, visits with endocrinology for management of CKD, visits with podiatry for management of toenail fungus, dermatology " "visit, and repeat neuropsychological testing with Dr. Selby (results below). Records also include the following [employer] message from family dated 12/17/22:   Just wanted to update you about Kindra's health and mood swings  she has had in the last couple of months. Kindra has been isolating herself in her room a lot in the last couple of months. This is very different from her always wanting to be with everyone in the common areas. She is also not wanting to join activities with friends when we have our Friday nights get together lately, or is rude to everyone and asks to go home right away. And when she does join with activities or her housemates out in the common areas, Kindra is very rude, grumpy, and bossy toward everyone. Kindra has had very high blood sugar levels that we are trying to get down, but our normal drinking water and exercising is not working. Kindra did have covid just before Thanksgiving, and had a UTI in the last couple of weeks. Kindra is making up a lot of stories, and is seeming more confused at times. This is getting to the point that Kindra's housemates are asking why Kindra does not join the house anymore, or why she is so grumpy. When Kindra is in her room, she looks at books or sleeps. Mostly sleeping. She just doesn't seem to enjoy anything or even happy anymore    We have been noticing that Kindra has been forgetting things, and getting confused at times more often. I even had her day program  pull me to the side after dropping off Kindra to tell me that Kindra was very \"off\" and confused about things she does, a couple of times last week. Kindra has also been doing the same thing over and over again because she forgot that she has already done the project like making her lunch for the next day, and remade it three times. Or will say things like\" we can't talk about the dogs running on the highway anymore, that isn't my problem.\" It is very random things like that, and it is confusing staff " when they try to ask more about what she was talking about.     I reviewed new lab results since their last visit, which include:      Ref Range & Units 4/17/23   Sodium 135 - 144 mmol/L 139    Potassium 3.4 - 5.1 mmol/L 4.6    Chloride 98 - 107 mmol/L 94 Low     Carbon Dioxide 22 - 30 mmol/L 32 High     Calcium 8.6 - 10.3 mg/dL 9.6    Glucose 74 - 100 mg/dL 155 High     Blood Urea nitrogen 7 - 17 mg/dL 22 High     Creatinine 0.52 - 1.04 mg/dL 1.17 High     Anion Gap 5 - 15 mmol/L 13    Glomerular Filtration Rate >60 mL/min/1.73 m*2 47      Hemoglobin A1C 4.8 - 5.6 % 7.9 High       Thyroid Stimulating hormone 0.47 - 4.68 mIU/L 5.05 High       T3, TOTAL 97 - 169 ng/dL 126      T4, Free 0.78 - 2.19 ng/dL 1.28        URINALYSIS, REFLEX TO MICROSCOPIC   Ref Range & Units 3/13/23   UA Color Light Yellow, Yellow Yellow    Urine Appearance Clear Clear    Urine Specific Gravity 1.005 - 1.030 1.005    Urine pH 5.0 - 8.0 6.0    Urine Leukocyte Esterase Negative 2+ Abnormal     Urine Nitrates Negative Negative    Urine Protein Negative Trace Abnormal     Urine Glucose Negative Negative    Urine Ketones Negative Negative    Urine Urobilinogen Normal Normal    Urine Bilirubin Negative Negative    Urine Blood Negative 1+ Abnormal        Ref Range & Units 3/13/23   WBC 4.0 - 11.0 X10*3u/L 8.0    RBC 3.80 - 5.20 X10*6/uL 4.72    HGB 12.0 - 16.0 g/dl 13.4    HCT 35.0 - 47.0 % 41.5    MCV 80 - 98 fL 87.9    MCH 27.0 - 34.0 pg 28.4    MCHC 32 - 36 g/dl 32.3     - 420 X10*3/uL 301    RDW-CV 11.6 - 14.4 % 14.0    RDW-SD 36.5 - 46.3 fL 45.0       Ref Range & Units 1/11/23   Cholesterol 100 - 200 mg/dL 161    Triglycerides 35 - 150 mg/dL 251 High     HDL Cholesterol, Measured 45 - 60 mg/dL 37 Low     LDL Cholesterol, Calculated  <=129 mg/dL 74    Non-HDL Cholesterol <=130 mg/dL 124      I reviewed new imaging/Testing since their last visit, including:   Test Provider Result Date    Neuropsych Testing  Rich Selby, PhD, LP  The  neuropsychological results are abnormal. Compared to testing 11 months ago, there has not been a marked decline. Most scores are stable within test-retest variability. Ms. Holt continues to display global impairment. The data do not suggest focal or lateralized cerebral dysfunction. There is diffuse dysfunction. I do believe that she has declined cognitively from her lifelong baseline in recent years, but there has not been any sort of precipitous cognitive decline since last year s evaluation.      In the setting of already low cognitive baselines, it is hard to measure decline. Signs of an additional dementia process are typically more observable in emotional and behavioral matters. Her , who has known her well for 4 years, reports substantial changes in mood, temperament, daily living skills, and overall functional capacity. Information from interview and the DSQIID do suggest a dementia syndrome on top of the developmental disability. Epilepsy likely contributes, as does her advanced age. Alzheimer s pathology cannot be ruled out.  2/13/23       Assessment and Care Plan:   #Alzheimer's disease dementia      A straightforward assignment of dementia due to Alzheimer's disease is complicated by the baseline low intellectual functioning and epilepsy, and CKD, but overall there's been an confident informant report of behavioral, cognitive and functional decline in the past 4 years that has no other better explanation. Her MRI shows atrophy, but it is not beyond what is expected for age, which certainly does not exclude Alzheimer's disease. Her sodium has now corrected and is normalized.    We will start on donepezil 5 mg daily. If tolerated, she can increase to 10 mg daily.    1 year follow-up    Standard recommendations:  Exercise is the only known therapy that can delay cognitive decline. We recommend you perform at least 30 minutes of exercise (preferably aerobic, but other more mild forms  "are OK -- the main idea is to stay physically active) for 5 days a week.    Continue to optimize cerebrovascular risk factors (blood pressure control, lipid control, glycemic control, healthy diet and exercise).    Stay socially and intellectually active as much as possible. Participate in activities that are enjoyable to you. There is no need to perform mental quizzes or \"mind games\" to boost memory; studies have demonstrated that these specialized memory games do not actually boost the patient's cognitive performance in activities outside the game.     Eat healthy. A balanced diet with all major food groups is recommended. Other recommended diets include the Mediterranean diet.    Remove dangerous objects or fall hazards from the home environment.    The Alzheimer's Association has many resources, support groups, and FAQs for caregivers and patients living with dementia. I would highly recommend visiting their webpage: Alz.org    Community Resources:  https://mngwep.[facility]/dementia-resources    Caregiver Support Program that helps to line up the right services for your and your loved one:  https://[facility].org/services/caregiver-assurance  Speak with an Advisor to learn how Caregiver Assurance can help you: 115-444-CARE([phone])    Caregiving reading materials  I recommend materials published from the NIH.   https://order.gemini.nih.gov/publication/caring-for-a-person-with-alzheimers-disease-your-easy-to-use-guide  https://www.gemini.nih.gov/health/caregiving  https://www.gemini.nih.gov/health/alzheimers/caregiving  Another well regarded book is The 36-Hour Day: A Family Guide to Caring for People Who Have Alzheimer Disease, Other Dementias, and Memory Loss  [employer] [place] is a good book that discusses the caregiver experience    Adult rehabilitative mental health services (ARMHS) is a range of services that helps an individual develop and enhance psychiatric stability, social competencies, personal and " emotional adjustment, and independent living and community skills. You can find out more at:  https://[place].gov/dhs/people-we-serve/people-with-disabilities/health-care/adult-mental-health/programs-services/[facility].jsp  Or [facility] 839-607-2396    Today, I spent 35 minutes reviewing the chart, personally assessing objective testing, direct patient care, completing documentation and billing.          Again, thank you for allowing me to participate in the care of your patient.      Sincerely,    Iggy Leslie MD

## 2023-10-02 ENCOUNTER — VIRTUAL VISIT (OUTPATIENT)
Dept: NEUROLOGY | Facility: CLINIC | Age: 78
End: 2023-10-02
Payer: COMMERCIAL

## 2023-10-02 VITALS — HEIGHT: 62 IN | BODY MASS INDEX: 29.81 KG/M2 | WEIGHT: 162 LBS

## 2023-10-02 DIAGNOSIS — R40.4 NONSPECIFIC PAROXYSMAL SPELL: Primary | ICD-10-CM

## 2023-10-02 ASSESSMENT — PAIN SCALES - GENERAL: PAINLEVEL: NO PAIN (0)

## 2023-10-02 NOTE — LETTER
"10/2/2023       RE: Kindra Holt  : 1945   MRN: [id number]      Dear Colleague,    Thank you for referring your patient, Kindra Holt, to the [facility] at [facility]. Please see a copy of my visit note below.      [facility] Epilepsy Care Progress Note    Patient:  Kindra Holt  :  1945   Age:  78 year old   Today's Office Visit:  10/2/2023      HPI:   Joined with staff member Goyo. Overall she is well. She had two focal seizures with impairment in awareness  in spring 2023.     The patient recently completed repeat Neuropsychology testing (2023) which continued to note global cognitive impairment. While the patient appears to have declined over the past few years, her testing compared from 11mo prior was stable. Per Christiana she \"says things that are random and not true\". She now has a new diagnosis of Alzheimer's disease dementia by Dr. Leslie. Her mood has stables. She does interact with housemates. No physical outburst (no hitting, no biting, and no throwing).     Last spell was spring 2023 and 2022 (she was vomiting, confused, repeats uhmm). Caregiver denies dizziness, no double vision, no nausea, no vomiting, no abdominal pain, no mood changes, no ER visits, no hospitalizations.  On this visit we spoke about patient's seizures, antiepileptic drug, and plan of epilepsy are. Patient/caregiver was agreeable with plan of care.      Seizure type:   Spell Type 1: These are described as  \"odd spells, vomited several times, does not respond, eyes were closed, increased drooling, very confused, did not follow commands, she is not able remember how to turn on faucet, speech slurred, stiffens\".  No clear warning sign. Prior to 2020 she had these spells daily.  Last spell was spring 2023, 2022, 2022 and prior to this 2021.    Current antiepileptic drug:   Levetiracetam 500 mg morning " "and 1000 mg at night       Past antiepileptic drug-   - Lamotrigine caused rash - stopped 7/2020 she developed lamotrigine induced maculopapular rash in sun exposed areas (face/neck/forearm) with itching, no bleeding or ulceration noted. After stopping lamotrigine her rash resolved.   - Oxcarbazepine cause severe mood issues in 12/2021 150 mg morning-300 mg twice a day: \"aggression, defiant, feisty, hallucinating, mean, more flashbacks, accusing housemates, hitting house mate, very agitated\". Weaned off oxcarbazepine.      Exam:   Ht 5' 2\" (157.5 cm)   Wt 162 lb (73.5 kg)   BMI 29.63 kg/m      Wt Readings from Last 4 Encounters:   10/02/23 162 lb (73.5 kg)   06/07/23 166 lb (75.3 kg)   05/15/23 166 lb 14.4 oz (75.7 kg)   12/26/22 168 lb (76.2 kg)     Neuro: Alert, orientated, speech is fluent, face is symmetric, extra-ocular movement in tact, no focal deficits noted.      Impression:   Recurrent stereotyped spells concerning for focal impaired seizure that progress to generalized tonic-clonic convulsion   History of DM and CKD  Alzheimer's disease dementia followed by Dr. Leslie  History of questionable TIA (slurred speech with no acute changes on MRI brain, these may have been seizures)     Discussion:   Discussion: 78 year old RHF with history of epilepsy, DM, new diagnosed Alzheimer's, and CKD on monotherapy levetiracetam for epilepsy.  Last focal seizures with impairment in awareness  spring 2023. In regards to antiseizure medication, she is very sensitive to antiepileptic drug she had lamotrigine induced sun sensitivity rash and aggression with oxcarbazepine  (very sensitive to antiepileptic drugs). We have not been able to transition her to another antiepileptic drug. We will continue levetiracetam 500-1000 (most seizure are early morning). Higher doses cause mood issues.      Plan:   1. Continue Levetiracetam 500 mg morning and 1000 mg night  2. Follow up  8 months   3. Please follow up  with Dr. Leslie and " determine Kindra should increase donepezil 10 mg daily.   4. Please review resources for Alzheimer's disease dementia Dr. Leslie note on 6/2023 and this will be helfpul.       Again, thank you for allowing me to participate in the care of your patient.      Sincerely,    Deneen Frazier MD

## 2023-10-02 NOTE — PATIENT INSTRUCTIONS
1. Continue Levetiracetam 500 mg morning and 1000 mg night  2. Follow up  8 months   3. Please follow up  with Dr. Leslie and determine Kindra should increase donepezil 10 mg daily.   4. Please review resources for Alzheimer's disease dementia Dr. Leslie note on 6/2023 and this will be helfpul.     Deneen Frazier MD

## 2023-10-02 NOTE — PROGRESS NOTES
"Virtual Visit Details    Type of service:  Video Visit   Video Start Time: 8:21 AM  Video End Time: 8:39AM    Originating Location (pt. Location): Home    Distant Location (provider location):  Off Site   Platform used for Video Visit: Mar [facility] Epilepsy Care Progress Note    Patient:  Kindra Holt  :  1945   Age:  78 year old   Today's Office Visit:  10/2/2023      HPI:   Joined with staff member Christiana and Carly. Overall she is well. She had two focal seizures with impairment in awareness  in spring 2023.     The patient recently completed repeat Neuropsychology testing (2023) which continued to note global cognitive impairment. While the patient appears to have declined over the past few years, her testing compared from 11mo prior was stable. Per Christiana she \"says things that are random and not true\". She now has a new diagnosis of Alzheimer's disease dementia by Dr. Leslie. Her mood has stables. She does interact with housemates. No physical outburst (no hitting, no biting, and no throwing).     Last spell was spring 2023 and 2022 (she was vomiting, confused, repeats uhmm). Caregiver denies dizziness, no double vision, no nausea, no vomiting, no abdominal pain, no mood changes, no ER visits, no hospitalizations.  On this visit we spoke about patient's seizures, antiepileptic drug, and plan of epilepsy are. Patient/caregiver was agreeable with plan of care.      Seizure type:   Spell Type 1: These are described as  \"odd spells, vomited several times, does not respond, eyes were closed, increased drooling, very confused, did not follow commands, she is not able remember how to turn on faucet, speech slurred, stiffens\".  No clear warning sign. Prior to 2020 she had these spells daily.  Last spell was spring 2023, 2022, 2022 and prior to this 2021.    Current antiepileptic drug:   Levetiracetam 500 mg morning and 1000 mg at night       Past antiepileptic drug- " "  - Lamotrigine caused rash - stopped 7/2020 she developed lamotrigine induced maculopapular rash in sun exposed areas (face/neck/forearm) with itching, no bleeding or ulceration noted. After stopping lamotrigine her rash resolved.   - Oxcarbazepine cause severe mood issues in 12/2021 150 mg morning-300 mg twice a day: \"aggression, defiant, feisty, hallucinating, mean, more flashbacks, accusing housemates, hitting house mate, very agitated\". Weaned off oxcarbazepine.      Exam:   Ht 5' 2\" (157.5 cm)   Wt 162 lb (73.5 kg)   BMI 29.63 kg/m      Wt Readings from Last 4 Encounters:   10/02/23 162 lb (73.5 kg)   06/07/23 166 lb (75.3 kg)   05/15/23 166 lb 14.4 oz (75.7 kg)   12/26/22 168 lb (76.2 kg)     Neuro: Alert, orientated, speech is fluent, face is symmetric, extra-ocular movement in tact, no focal deficits noted.      Impression:   Recurrent stereotyped spells concerning for focal impaired seizure that progress to generalized tonic-clonic convulsion   History of DM and CKD  Alzheimer's disease dementia followed by Dr. Leslie  History of questionable TIA (slurred speech with no acute changes on MRI brain, these may have been seizures)     Discussion:   Discussion: 78 year old RHF with history of epilepsy, DM, new diagnosed Alzheimer's, and CKD on monotherapy levetiracetam for epilepsy.  Last focal seizures with impairment in awareness  spring 2023. In regards to antiseizure medication, she is very sensitive to antiepileptic drug she had lamotrigine induced sun sensitivity rash and aggression with oxcarbazepine  (very sensitive to antiepileptic drugs). We have not been able to transition her to another antiepileptic drug. We will continue levetiracetam 500-1000 (most seizure are early morning). Higher doses cause mood issues.      Plan:   1. Continue Levetiracetam 500 mg morning and 1000 mg night  2. Follow up  8 months   3. Please follow up  with Dr. Leslie and determine Kindra should increase donepezil 10 mg " daily.   4. Please review resources for Alzheimer's disease dementia Dr. Leslie note on 6/2023 and this will be helfpul.     Deneen Frazier MD

## 2023-10-02 NOTE — NURSING NOTE
Pt is unable to complete PHQ-9.     Is the patient currently in the state of [place]? YES    Visit mode:VIDEO    If the visit is dropped, the patient can be reconnected by: VIDEO VISIT: Send to e-mail at: [email or web address]    Will anyone else be joining the visit? Yes, Alexsandra  will be joining.   (If patient encounters technical issues they should call [phone]    How would you like to obtain your AVS? MyChart    Are changes needed to the allergy or medication list? Yes Alexsandra  states pt is taking Pioglitazone 15MG tablet once daily.     Reason for visit: BREANNE ABDI

## 2024-01-04 ASSESSMENT — PAIN SCALES - GENERAL: PAINLEVEL: NO PAIN (0)
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
## 2024-01-12 ENCOUNTER — VIRTUAL VISIT (OUTPATIENT)
Dept: NEUROLOGY | Facility: CLINIC | Age: 79
End: 2024-01-12
Payer: COMMERCIAL

## 2024-01-12 VITALS
SYSTOLIC BLOOD PRESSURE: 120 MMHG | DIASTOLIC BLOOD PRESSURE: 80 MMHG | BODY MASS INDEX: 30.91 KG/M2 | HEIGHT: 62 IN | WEIGHT: 168 LBS

## 2024-01-12 DIAGNOSIS — F02.80 ALZHEIMER'S DISEASE (H): Primary | ICD-10-CM

## 2024-01-12 DIAGNOSIS — F41.9 ANXIETY: ICD-10-CM

## 2024-01-12 DIAGNOSIS — G30.9 ALZHEIMER'S DISEASE (H): Primary | ICD-10-CM

## 2024-01-12 DIAGNOSIS — R46.89 AGGRESSION: ICD-10-CM

## 2024-01-12 RX ORDER — ESCITALOPRAM OXALATE 5 MG/1
TABLET ORAL
Qty: 214 TABLET | Refills: 0 | Status: SHIPPED | OUTPATIENT
Start: 2024-01-12 | End: 2024-01-19 | Stop reason: SINTOL

## 2024-01-12 ASSESSMENT — PATIENT HEALTH QUESTIONNAIRE - PHQ9: SUM OF ALL RESPONSES TO PHQ QUESTIONS 1-9: 0
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
## 2024-01-12 NOTE — PROGRESS NOTES
Virtual Visit Details    Type of service:  Video Visit   Video Start Time: 4:34PM  Video End Time:4:54PM    Originating Location (pt. Location): Home    Distant Location (provider location):  On-site  Platform used for Video Visit: Mar

## 2024-01-12 NOTE — NURSING NOTE
Is the patient currently in the state of [place]? YES    Visit mode:VIDEO    If the visit is dropped, the patient can be reconnected by: VIDEO VISIT: Send to e-mail at: [email or web address]    Will anyone else be joining the visit? NO  (If patient encounters technical issues they should call [phone]    How would you like to obtain your AVS? MyChart    Are changes needed to the allergy or medication list? No    Reason for visit: BREANNE ABDI

## 2024-01-18 ENCOUNTER — MYC MEDICAL ADVICE (OUTPATIENT)
Dept: NEUROLOGY | Facility: CLINIC | Age: 79
End: 2024-01-18

## 2024-01-19 NOTE — TELEPHONE ENCOUNTER
I spoke with Alexsandra who sent the initial message. I explained that due to Kindra's symptoms I agree that she should stop taking the escitalopram.     We discussed that the medication was originally ordered to address increased irritability and lashing out which was brought up at her last appointment with Dr. Leslie. Kindra does continue to experience some irritability but lashing out is not a regular occurrence at this time. Kindra is currently on Keppra which is also known to cause mood swings and irritability. Alexsandra will monitor symptoms closely and reach out to us if mood changes worsen and we can explore other medication options at that time. For now, they prefer to take a modest approach given Kindra's sensitivity to medication changes. I think this is reasonable.     Alexsandra will reach out with further questions or concerns.

## 2024-04-05 ENCOUNTER — TELEPHONE (OUTPATIENT)
Dept: NEUROLOGY | Facility: CLINIC | Age: 79
End: 2024-04-05
Payer: COMMERCIAL

## 2024-04-05 NOTE — TELEPHONE ENCOUNTER
I received a call from the ED in [place]. Breakthrough seizure- she was seen at her group home to have stopped walking, slowly fell to her knees. She had no head injury or leg injuries and was able to brace herself as she went down slowly. Seizure at 11 am and she had missed her morning keppra since she slept late. She was found to have a UTI for which she started treatment.     Breakthrough seizure in the setting of UTI and missed AED dose. No changes were recommended.     Jim Carr MD

## 2024-07-01 DIAGNOSIS — G40.919 INTRACTABLE EPILEPSY WITHOUT STATUS EPILEPTICUS, UNSPECIFIED EPILEPSY TYPE (H): ICD-10-CM

## 2024-07-02 DIAGNOSIS — G40.919 INTRACTABLE EPILEPSY WITHOUT STATUS EPILEPTICUS, UNSPECIFIED EPILEPSY TYPE (H): ICD-10-CM

## 2024-07-02 RX ORDER — LEVETIRACETAM 1000 MG/1
TABLET ORAL
Qty: 60 TABLET | Refills: 0 | OUTPATIENT
Start: 2024-07-02

## 2024-07-02 RX ORDER — LEVETIRACETAM 1000 MG/1
TABLET ORAL
Qty: 135 TABLET | Refills: 2 | Status: SHIPPED | OUTPATIENT
Start: 2024-07-02

## 2024-07-02 NOTE — TELEPHONE ENCOUNTER
Last seen: 10/2023  RTC: 7 months  Cancel: none  No-show: none  Next appt: 8/1    Incoming refill from patients pharmacy via phone    Medication requested: levETIRAcetam (KEPPRA) 1000 MG tablet   Directions: : TAKE 1/2 TABLET BY MOUTH IN THE MORNING AND 1 TABLET IN THE EVENING   Qty: 135 tablets  Last refilled: 5/15/2023    Medication refill approved per refill protocol

## 2024-07-11 DIAGNOSIS — F02.80 ALZHEIMER'S DISEASE (H): ICD-10-CM

## 2024-07-11 DIAGNOSIS — G30.9 ALZHEIMER'S DISEASE (H): ICD-10-CM

## 2024-07-11 RX ORDER — DONEPEZIL HYDROCHLORIDE 5 MG/1
5 TABLET, FILM COATED ORAL AT BEDTIME
Qty: 90 TABLET | Refills: 2 | Status: SHIPPED | OUTPATIENT
Start: 2024-07-11

## 2024-07-14 ENCOUNTER — HEALTH MAINTENANCE LETTER (OUTPATIENT)
Age: 79
End: 2024-07-14

## 2024-08-01 ENCOUNTER — VIRTUAL VISIT (OUTPATIENT)
Dept: NEUROLOGY | Facility: CLINIC | Age: 79
End: 2024-08-01
Payer: COMMERCIAL

## 2024-08-01 DIAGNOSIS — G40.919 INTRACTABLE EPILEPSY WITHOUT STATUS EPILEPTICUS, UNSPECIFIED EPILEPSY TYPE (H): Primary | ICD-10-CM

## 2024-08-01 RX ORDER — MULTIVITAMIN WITH IRON
100 TABLET ORAL DAILY
Qty: 30 TABLET | Refills: 11 | Status: SHIPPED | OUTPATIENT
Start: 2024-08-01

## 2024-08-01 RX ORDER — DIAZEPAM 10 MG/100UL
10 SPRAY NASAL PRN
Qty: 2 EACH | Refills: 1 | Status: SHIPPED | OUTPATIENT
Start: 2024-08-01

## 2024-08-01 RX ORDER — LEVETIRACETAM 500 MG/1
1000 TABLET, FILM COATED, EXTENDED RELEASE ORAL 2 TIMES DAILY
Qty: 360 TABLET | Refills: 3 | Status: SHIPPED | OUTPATIENT
Start: 2024-08-01

## 2024-08-01 RX ORDER — PIOGLITAZONEHYDROCHLORIDE 30 MG/1
1 TABLET ORAL DAILY
COMMUNITY
Start: 2023-11-15

## 2024-08-01 RX ORDER — B-COMPLEX WITH VITAMIN C
500 TABLET ORAL DAILY
COMMUNITY
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
## 2024-08-01 NOTE — PATIENT INSTRUCTIONS
Plan:   Increase levetiracetam and change to XR 1000 mg twice a day. She may have mood issues. Try taking Vitamin B6 100 mg per day to determine if this helps her mood.     Please follow up  with Dr. Leslie for Alzheimer's disease dementia Dr. Leslie.     She is DNR and DNI     Establish care with [facility] doctor in 1-2 months, will need 1 hour for visit time.     Seizure rescue - Valtoco 10 MG:  Diazepam nasal spray (Valtoco) is a benzodiazepine. You are prescribed the 10 mg blister pack, which has ONE diazepam 10 mg nostril spray. For seizure longer than 3 minutes or 2 seizure within 8 hour period spray 10 mg spray into one nostril. Maximum ONE doses in 24 hours or 4 doses in one week. She is DNR and DNI.

## 2024-08-01 NOTE — PROGRESS NOTES
"Is the patient currently in the state of [place]?    If the video visit is dropped, the invitation should be resent by: Text to cell phone: [phone]    Will anyone else be joining the visit? Yes: Christiana. How would they like to receive their invitation? Text to cell phone: [phone]      How would you like to obtain your AVS? Mail a copy    Virtual Visit Details    Type of service:  Video Visit   Video Start Time: 10:41 AM  Video End Time: 11:01 AM    Originating Location (pt. Location): Home    Distant Location (provider location):  Off Site   Platform used for Video Visit: Mar      [facility] Progress Note    Patient:  Kindra Holt  :  1945   Age:  78 year old   Today's Office Visit:  2024      HPI:   Joined with staff member Goyo. She had impetigo and had a seizure 3/2024. They went to ER twice in one day. I was not able find ER note in [facility] or care everywhere, they went to [facility]. Staff witness \"she fell, whole body convulsion, glasses broke, she had two in one day, seizures lasted 5 minutes\". Seizure medications were not increased. Patient states antiepileptic drug were not missed, she had impetigo infection, no sleep deprivation, no excessive alcohol use or recreational drug use, no obvious trigger for seizure.     Spell (seizure like) prior to this was spring 2023 and 2022 (she was vomiting, confused, repeats uhmm). Caregiver denies dizziness, no double vision, no nausea, no vomiting, no abdominal pain, no mood changes, no ER visits, no hospitalizations.  On this visit we spoke about patient's seizures, antiepileptic drug, and plan of epilepsy are. Patient/caregiver was agreeable with plan of care.      Seizure type:   Spell Type 1: These are described as  \"odd spells, vomited several times, does not respond, eyes were closed, increased drooling, very confused, did not follow commands, she is not able remember how to turn on faucet, speech slurred, " "stiffens\".  No clear warning sign. Prior to June 2020 she had these spells daily.  Last spell was spring 2023, 11/2022, 2/2022 and prior to this September 2021.    Seizure type 2: Convulsion - She had impetigo and had a seizure 3/2024. They went to ER twice in one day. I was not able find ER note in [facility] or care everywhere, they went to [facility]. Staff witness \"she fell, whole body convulsion, glasses broke, she had two in one day, seizures lasted 5 minutes\".    Current antiepileptic drug:   Levetiracetam 500 mg morning and 1000 mg at night       Past antiepileptic drug-   - Lamotrigine caused rash - stopped 7/2020 she developed lamotrigine induced maculopapular rash in sun exposed areas (face/neck/forearm) with itching, no bleeding or ulceration noted. After stopping lamotrigine her rash resolved.   - Oxcarbazepine cause severe mood issues in 12/2021 150 mg morning-300 mg twice a day: \"aggression, defiant, feisty, hallucinating, mean, more flashbacks, accusing housemates, hitting house mate, very agitated\". Weaned off oxcarbazepine.      Exam:   There were no vitals taken for this visit.    Wt Readings from Last 4 Encounters:   01/04/24 168 lb (76.2 kg)   10/02/23 162 lb (73.5 kg)   06/07/23 166 lb (75.3 kg)   05/15/23 166 lb 14.4 oz (75.7 kg)     Neuro: Alert, orientated, speech is fluent, face is symmetric, extra-ocular movement in tact, no focal deficits noted. Green nail polish.      Impression:   Recurrent stereotyped spells concerning for focal impaired seizure that progress to generalized tonic-clonic convulsion   History of DM and CKD  Alzheimer's disease dementia followed by Dr. Leslie  History of questionable TIA (slurred speech with no acute changes on MRI brain, these may have been seizures)     Discussion:   Discussion: 78 year old RHF with history of epilepsy, DM, new diagnosed Alzheimer's, and CKD on monotherapy levetiracetam for epilepsy.  She headache generalized tonic-clonic convulsion " with impetigo infection. Last focal seizures with impairment in awareness  spring 2023. In regards to antiseizure medication, she is very sensitive to antiepileptic drug she had lamotrigine induced sun sensitivity rash and aggression with oxcarbazepine  (very sensitive to antiepileptic drugs). We have not been able to transition her to another antiepileptic drug. We will increase levetiracetam and change to XR 1000 mg twice a day  (most seizure are early morning). Higher levetiracetam doses cause mood issues, we will have to monitor this.  If she can not tolerate levetiracetam then we can consider lacosamide.     During today's visit, I informed the caregiver that I will no longer be at [facility] after September 2024. Patient's will be transitioned to another provider within the [facility]. I carefully reviewed the plan of care and medications with the patient, addressing all their questions and providing reassurance and support regarding the transfer of care. Caregiver is agreeable with this plan of care.      Plan:   Increase levetiracetam and change to XR 1000 mg twice a day. She may have mood issues. Try taking Vitamin B6 100 mg per day to determine if this helps her mood.     Please follow up  with Dr. Leslie for Alzheimer's disease dementia Dr. Leslie.     She is DNR and DNI     Establish care with [facility] doctor in 1-2 months, will need 1 hour for visit time.     Seizure rescue - Valtoco 10 MG:  Diazepam nasal spray (Valtoco) is a benzodiazepine. You are prescribed the 10 mg blister pack, which has ONE diazepam 10 mg nostril spray. For seizure longer than 3 minutes or 2 seizure within 8 hour period spray 10 mg spray into one nostril. Maximum ONE doses in 24 hours or 4 doses in one week. She is DNR and DNI.       Deneen Frazier MD        I spent 26 minutes in total today to provide comprehensive  medical care.   I spent 4 minutes writing the note and placing  orders.   I spent 2 minutes  reviewing the chart.     The rest of the time was spent with the patient in face to face interview. During this time key medical decisions were made with review of medical chart prior to visit, visit with patient, counseling/education, and post visit work, including documentation of note on the day of visit. I addressed all questions the patient/caregiver raised in regards to epilepsy or related medical questions.

## 2024-08-13 ENCOUNTER — MYC MEDICAL ADVICE (OUTPATIENT)
Dept: NEUROLOGY | Facility: CLINIC | Age: 79
End: 2024-08-13

## 2024-08-14 ENCOUNTER — TELEPHONE (OUTPATIENT)
Dept: NEUROLOGY | Facility: CLINIC | Age: 79
End: 2024-08-14

## 2024-08-14 NOTE — TELEPHONE ENCOUNTER
Received Seizure Protocol Form to be completed. Form saved to [employer], encounter routed.  Georgiana White CMA

## 2024-12-04 ENCOUNTER — OFFICE VISIT (OUTPATIENT)
Dept: NEUROLOGY | Facility: CLINIC | Age: 79
End: 2024-12-04
Payer: COMMERCIAL

## 2024-12-04 VITALS
DIASTOLIC BLOOD PRESSURE: 81 MMHG | WEIGHT: 180.6 LBS | OXYGEN SATURATION: 97 % | TEMPERATURE: 96.9 F | HEART RATE: 80 BPM | BODY MASS INDEX: 33.23 KG/M2 | SYSTOLIC BLOOD PRESSURE: 133 MMHG | HEIGHT: 62 IN

## 2024-12-04 DIAGNOSIS — G40.919 INTRACTABLE EPILEPSY WITHOUT STATUS EPILEPTICUS, UNSPECIFIED EPILEPSY TYPE (H): ICD-10-CM

## 2024-12-04 RX ORDER — LEVETIRACETAM 500 MG/1
1000 TABLET, FILM COATED, EXTENDED RELEASE ORAL 2 TIMES DAILY
Qty: 360 TABLET | Refills: 3 | Status: SHIPPED | OUTPATIENT
Start: 2024-12-04

## 2024-12-04 RX ORDER — LACOSAMIDE 50 MG/1
100 TABLET ORAL 2 TIMES DAILY
Qty: 120 TABLET | Refills: 5 | Status: SHIPPED | OUTPATIENT
Start: 2024-12-04

## 2024-12-04 NOTE — PROGRESS NOTES
"  [facility]    Patient:  Kindra Holt  :  1945   Age:  78 year old   Today's Office Visit:  2024      HPI:   80 yo famle with history of intractable seizures. She is joined  Christiana in the clinic today. She was previously seen by Dr. Frazier. She was last seen by Dr. Frazier about 4 months ago. She had 4 seizures for the past 4 months. Typical seizures will start with confusion, vomiting, seizures lasted 2 minutes, followed by fatigue and confusion\", she may lose bowel or bladder control. Seizure medications were not changed recently. Antiepileptic drug were not missed, no sleep deprivation, no excessive alcohol use or recreational drug use, no obvious trigger for seizure.     She was having 1-2 seizures a year before . This year she had total at 6 seizures.    Caregiver denies dizziness, no double vision, no nausea, no vomiting, no abdominal pain, no mood changes.      Seizure type:   Spell Type 1: These are described as  \"odd spells, vomited several times, does not respond, eyes were closed, increased drooling, very confused, did not follow commands, she is not able remember how to turn on faucet, speech slurred, stiffens\".  No clear warning sign. Prior to 2020 she had these spells daily.  Last spell was spring 2023, 2022, 2022 and prior to this 2021.    Seizure type 2: Convulsion - She had impetigo and had a seizure 3/2024. They went to ER twice in one day. I was not able find ER note in [facility] or care everywhere, they went to [facility]. Staff witness \"she fell, whole body convulsion, glasses broke, she had two in one day, seizures lasted 5 minutes\".    Current antiepileptic drug:   Levetiracetam XR 1000 mg bid       Past antiepileptic drug-   - Lamotrigine caused rash - stopped 2020 she developed lamotrigine induced maculopapular rash in sun exposed areas (face/neck/forearm) with itching, no bleeding or ulceration noted. " "After stopping lamotrigine her rash resolved.   - Oxcarbazepine cause severe mood issues in 12/2021 150 mg morning-300 mg twice a day: \"aggression, defiant, feisty, hallucinating, mean, more flashbacks, accusing housemates, hitting house mate, very agitated\". Weaned off oxcarbazepine.      Exam:   Blood pressure 133/81, pulse 80, temperature 96.9  F (36.1  C), temperature source Temporal, height 5' 2\" (157.5 cm), weight 180 lb 9.6 oz (81.9 kg), SpO2 97%.    Neuro: Alert, orientated, speech is fluent, face is symmetric, extra-ocular movement in tact, no focal deficits noted. Green nail polish. Motor normal. Gait: normal. No ataxia.     Impression:   Recurrent stereotyped spells concerning for focal impaired seizure that progress to generalized tonic-clonic convulsion   History of DM and CKD  Alzheimer's disease dementia followed by Dr. Leslie  History of questionable TIA (slurred speech with no acute changes on MRI brain, these may have been seizures)     Discussion:   Epilepsy.   78 year old RHF with history of epilepsy, DM, new diagnosed Alzheimer's, and CKD on monotherapy levetiracetam for epilepsy.    For the pats 4 months, she had 4 seizures, all focal impaired seizures.    We will continue Levetiracetam and change to XR 1000 mg twice a day. Will add Vimpat to her Regimen.     Her Guardian is Laura Anguiano, her niece.    Plan:  1. Continue Levetiracetam XR 1000 mg bid.   2. Follow up  with Dr. Leslie for Alzheimer's disease dementia.   3. She is DNR and DNI   4. Start Vimpat 50 mg bid for 2 weeks, and 100 mg bid.  5. RTC in 3 months. Will check labs next visit: Irving Nieves  6. EEG as outpatient  7. Seizure rescue - Valtoco 10 MG: Nostril spray. For seizure longer than 3 minutes or 2 seizure within 8 hour period spray 10 mg spray into one nostril. Maximum ONE doses in 24 hours or 4 doses in one week. She is DNR and DNI.       The longitudinal plan of care for seizures was addressed during this visit. Due to the " added complexity in care, I will continue to support this patient in the subsequent management of this condition and with the ongoing continuity of care of this condition.       45 min total time was spent on the day of this visit.      25 min was spent on face to face time  20 min was spent on preparation of visit to review charts and labs, ordering medications and tests, and documentation of clinical information

## 2024-12-04 NOTE — PATIENT INSTRUCTIONS
Plan:  1. Continue Levetiracetam XR 1000 mg bid.   2. Follow up  with Dr. Leslie for Alzheimer's disease dementia.   3. She is DNR and DNI   4. Start Vimpat 50 mg bid for 2 weeks, and 100 mg bid.  5. RTC in 3 months. Will check labs next visit: Irving Nieves  6. Seizure rescue - Valtoco 10 MG

## 2024-12-04 NOTE — LETTER
"2024       RE: Kindra Holt  : 1945   MRN: [id number]      Dear Colleague,    Thank you for referring your patient, Kindra Holt, to the [facility] EPILEPSY [facility]ARE at [facility]. Please see a copy of my visit note below.      [facility]    Patient:  Kindra Holt  :  1945   Age:  78 year old   Today's Office Visit:  2024      HPI:   80 yo famle with history of intractable seizures. She is joined  Christiana in the clinic today. She was previously seen by Dr. Frazier. She was last seen by Dr. Frazier about 4 months ago. She had 4 seizures for the past 4 months. Typical seizures will start with confusion, vomiting, seizures lasted 2 minutes, followed by fatigue and confusion\", she may lose bowel or bladder control. Seizure medications were not changed recently. Antiepileptic drug were not missed, no sleep deprivation, no excessive alcohol use or recreational drug use, no obvious trigger for seizure.     She was having 1-2 seizures a year before . This year she had total at 6 seizures.    Caregiver denies dizziness, no double vision, no nausea, no vomiting, no abdominal pain, no mood changes.      Seizure type:   Spell Type 1: These are described as  \"odd spells, vomited several times, does not respond, eyes were closed, increased drooling, very confused, did not follow commands, she is not able remember how to turn on faucet, speech slurred, stiffens\".  No clear warning sign. Prior to 2020 she had these spells daily.  Last spell was spring 2023, 2022, 2022 and prior to this 2021.    Seizure type 2: Convulsion - She had impetigo and had a seizure 3/2024. They went to ER twice in one day. I was not able find ER note in [facility] or care everywhere, they went to [facility]. Staff witness \"she fell, whole body convulsion, glasses broke, she had two in one day, " "seizures lasted 5 minutes\".    Current antiepileptic drug:   Levetiracetam XR 1000 mg bid       Past antiepileptic drug-   - Lamotrigine caused rash - stopped 7/2020 she developed lamotrigine induced maculopapular rash in sun exposed areas (face/neck/forearm) with itching, no bleeding or ulceration noted. After stopping lamotrigine her rash resolved.   - Oxcarbazepine cause severe mood issues in 12/2021 150 mg morning-300 mg twice a day: \"aggression, defiant, feisty, hallucinating, mean, more flashbacks, accusing housemates, hitting house mate, very agitated\". Weaned off oxcarbazepine.      Exam:   Blood pressure 133/81, pulse 80, temperature 96.9  F (36.1  C), temperature source Temporal, height 5' 2\" (157.5 cm), weight 180 lb 9.6 oz (81.9 kg), SpO2 97%.    Neuro: Alert, orientated, speech is fluent, face is symmetric, extra-ocular movement in tact, no focal deficits noted. Green nail polish. Motor normal. Gait: normal. No ataxia.     Impression:   Recurrent stereotyped spells concerning for focal impaired seizure that progress to generalized tonic-clonic convulsion   History of DM and CKD  Alzheimer's disease dementia followed by Dr. Leslie  History of questionable TIA (slurred speech with no acute changes on MRI brain, these may have been seizures)     Discussion:   Epilepsy.   78 year old RHF with history of epilepsy, DM, new diagnosed Alzheimer's, and CKD on monotherapy levetiracetam for epilepsy.    For the pats 4 months, she had 4 seizures, all focal impaired seizures.    We will continue Levetiracetam and change to XR 1000 mg twice a day. Will add Vimpat to her Regimen.     Her Guardian is Laura Anguiano, her niece.    Plan:  1. Continue Levetiracetam XR 1000 mg bid.   2. Follow up  with Dr. Leslie for Alzheimer's disease dementia.   3. She is DNR and DNI   4. Start Vimpat 50 mg bid for 2 weeks, and 100 mg bid.  5. RTC in 3 months. Will check labs next visit: Irving Nieves  6. EEG as outpatient  7. Seizure " rescue - Valtoco 10 MG: Nostril spray. For seizure longer than 3 minutes or 2 seizure within 8 hour period spray 10 mg spray into one nostril. Maximum ONE doses in 24 hours or 4 doses in one week. She is DNR and DNI.       The longitudinal plan of care for seizures was addressed during this visit. Due to the added complexity in care, I will continue to support this patient in the subsequent management of this condition and with the ongoing continuity of care of this condition.       45 min total time was spent on the day of this visit.      25 min was spent on face to face time  20 min was spent on preparation of visit to review charts and labs, ordering medications and tests, and documentation of clinical information      Again, thank you for allowing me to participate in the care of your patient.      Sincerely,    Kenzie Delcid MD

## 2024-12-05 NOTE — NURSING NOTE
Pt unable to complete the PHQ-9.     Is the patient currently in the state of [place]? YES    Visit mode:VIDEO    If the visit is dropped, the patient can be reconnected by: VIDEO VISIT: Send to e-mail at: [email or web address]    Will anyone else be joining the visit? Yes, Alexsandra the  will be joining.    How would you like to obtain your AVS? MyChart    Are changes needed to the allergy or medication list? NO    Reason for visit: Video Visit (Follow up appt for seizures )    Medication and allergies have been reviewed.     Ramon Monae, VF         Patient  A&O x 4, respiration even and unlabored, reports to ED  complaining elevated glucose level after using Eliquis for over two days. As per patient, he has been on Xarelto, but stopped because it was too expensive. Complaining of excessive thirst, jittery and thirst.. No apparent distress noted. Safety maintained

## 2025-02-18 NOTE — PROGRESS NOTES
"CC: Follow Up    Prior History:  Kindra Holt is a 79 year old female who presents today for a follow-up visit. Please see my last encounter dated 1/12/24 and the initial encounter dated 4/22/22 for a full review of the patient's history. In summary, Ms. Holt has a past medical history of epilepsy (last seizure in Spring 2023, first seizures occurring in her 60s, focal onset with secondary generalization; managed with Keppra under the excellent care of Dr. Frazier), mild cognitive impairment and lifelong intellectual disability, hypothyroidism, stage III CKD, HTN, HLD, and type II  diabetes. There is also a questionable history of TIA (2020): Kindra was symptomatic with slurred speech but MRI showed no changes. She initially presented to the memory clinic for evaluation of persistent, non-paroxysmal memory concerns with visions of \"Jerod\", made-up stories, and inaccurately mixing together events/reports. She scored 14/30 on MMSE. She saw Dr. Hunt on March 3, 2022 who noted global brain dysfunction with a predilection for medial temporal, frontal and right hemispheric functioning.  He labs were normal other than known CKD (eGFR of 52). MRI was normal and did not show any atrophy beyond what would be expected for someone her age. During our last appointment we discussed obtaining an FDG-PET scan but Kindra preferred a watchful waiting approach. Kindra completed repeat neuropsychological testing in February 2023 with Dr. Selby which did not show a marked decline compared to the testing completed 11 months prior. Despite this, it was noted that Kindra had run off during museum tours and began telling stories that were \"90%\" untrue.     The diagnosis of dementia due to Alzheimer's disease is complicated by the baseline low intellectual functioning, epilepsy, and CKD, but overall there's been an confident informant report of behavioral, cognitive and functional decline in the past 4 years that is concerning for " a neurodegenerative disease.     She is currently taking donepezil 5mg daily.     Updates From This Visit:   Today, the patient presents with Alexsandra who is an independent historian and provides collateral information included below.     Cognition: Continued gradual decline, accelerated after seeing her brother at the end of his life. He was suffering from severe dementia.    Psych: Agitation that is redirectable. She enjoys the [employer] program and frequent field trips.     Motor: She fell and broke her arm.     Background medical issues: 4 seizures, one was a convulsive seizure. This is more than usual.    Exam:  Neuro: Alert, awake, very pleasant demeanor, fluent speech, able to stand, walk and sit without assistance. No parkinsonism.    I reviewed new external records since their last visit, which show visits with Dr. Delcid related to her epilepsy (in December 2024 she was started on Vimpat).     In August 2024 she was seen in the ED for a breakthrough seizure.     I reviewed new lab results since their last visit, which include: no pertinent new labs.     I reviewed new imaging since their last visit, including:   TEST LOCATION RESULT DATE    CT Head  [facility]  The ventricles, sulci and gyri are of normal size, shape and contour.  Midline structures are centrally located.  No convincing evidence of intra- or extra-axial fluid collections. Physiologic calcification within the dentate nucleus and basal ganglia.  8/4/24       Assessment and Care Plan:   #Alzheimer's disease dementia  #Other contributors from baseline low intellectual functioning and CKD  #Anger, irritability, rare physically aggressive behavior  #Recent fall with fracture    - PT for balance, referral sent to [facility] with instructions to send to [facility] ([facility] was not listed on chart)  - No changes to medications  - 1 year follow-up visit    Today, I spent 26 minutes reviewing the chart, personally assessing objective testing, direct patient care, completing  documentation and billing.    The longitudinal plan of care for the diagnosis(es)/condition(s) as documented were addressed during this visit. Due to the added complexity in care, I will continue to support Kindra in the subsequent management and with ongoing continuity of care.

## 2025-02-19 ENCOUNTER — OFFICE VISIT (OUTPATIENT)
Dept: NEUROLOGY | Facility: CLINIC | Age: 80
End: 2025-02-19
Payer: COMMERCIAL

## 2025-02-19 VITALS
SYSTOLIC BLOOD PRESSURE: 137 MMHG | HEART RATE: 74 BPM | TEMPERATURE: 97.1 F | OXYGEN SATURATION: 100 % | DIASTOLIC BLOOD PRESSURE: 78 MMHG

## 2025-02-19 DIAGNOSIS — G30.9 ALZHEIMER'S DISEASE (H): Primary | ICD-10-CM

## 2025-02-19 DIAGNOSIS — F02.80 ALZHEIMER'S DISEASE (H): Primary | ICD-10-CM

## 2025-02-19 RX ORDER — SODIUM CHLORIDE 1 G/1
1 TABLET ORAL DAILY
COMMUNITY
Start: 2025-02-12

## 2025-02-19 NOTE — LETTER
"2/19/2025       RE: Kindra Holt  [address] [place] [id number]     Dear Colleague,    Thank you for referring your patient, Kindra Holt, to the  [facility] at [facility]. Please see a copy of my visit note below.    CC: Follow Up    Prior History:  Kindra Holt is a 79 year old female who presents today for a follow-up visit. Please see my last encounter dated 1/12/24 and the initial encounter dated 4/22/22 for a full review of the patient's history. In summary, Ms. Holt has a past medical history of epilepsy (last seizure in Spring 2023, first seizures occurring in her 60s, focal onset with secondary generalization; managed with Keppra under the excellent care of Dr. Frazier), mild cognitive impairment and lifelong intellectual disability, hypothyroidism, stage III CKD, HTN, HLD, and type II  diabetes. There is also a questionable history of TIA (2020): Kindra was symptomatic with slurred speech but MRI showed no changes. She initially presented to the memory clinic for evaluation of persistent, non-paroxysmal memory concerns with visions of \"Jerod\", made-up stories, and inaccurately mixing together events/reports. She scored 14/30 on MMSE. She saw Dr. Hunt on March 3, 2022 who noted global brain dysfunction with a predilection for medial temporal, frontal and right hemispheric functioning.  He labs were normal other than known CKD (eGFR of 52). MRI was normal and did not show any atrophy beyond what would be expected for someone her age. During our last appointment we discussed obtaining an FDG-PET scan but Kindra preferred a watchful waiting approach. Kindra completed repeat neuropsychological testing in February 2023 with Dr. Selby which did not show a marked decline compared to the testing completed 11 months prior. Despite this, it was noted that Kindra had run off during museum tours and began telling " "stories that were \"90%\" untrue.     The diagnosis of dementia due to Alzheimer's disease is complicated by the baseline low intellectual functioning, epilepsy, and CKD, but overall there's been an confident informant report of behavioral, cognitive and functional decline in the past 4 years that is concerning for a neurodegenerative disease.     She is currently taking donepezil 5mg daily.     Updates From This Visit:   Today, the patient presents with Alexsandra who is an independent historian and provides collateral information included below.     Cognition: Continued gradual decline, accelerated after seeing her brother at the end of his life. He was suffering from severe dementia.    Psych: Agitation that is redirectable. She enjoys the [employer] program and frequent field trips.     Motor: She fell and broke her arm.     Background medical issues: 4 seizures, one was a convulsive seizure. This is more than usual.    Exam:  Neuro: Alert, awake, very pleasant demeanor, fluent speech, able to stand, walk and sit without assistance. No parkinsonism.    I reviewed new external records since their last visit, which show visits with Dr. Delcid related to her epilepsy (in December 2024 she was started on Vimpat).     In August 2024 she was seen in the ED for a breakthrough seizure.     I reviewed new lab results since their last visit, which include: no pertinent new labs.     I reviewed new imaging since their last visit, including:   TEST LOCATION RESULT DATE    CT Head  [facility]  The ventricles, sulci and gyri are of normal size, shape and contour.  Midline structures are centrally located.  No convincing evidence of intra- or extra-axial fluid collections. Physiologic calcification within the dentate nucleus and basal ganglia.  8/4/24       Assessment and Care Plan:   #Alzheimer's disease dementia  #Other contributors from baseline low intellectual functioning and CKD  #Anger, irritability, rare physically aggressive " behavior  #Recent fall with fracture    - PT for balance, referral sent to [facility] with instructions to send to [facility] ([facility] was not listed on chart)  - No changes to medications  - 1 year follow-up visit    Today, I spent 26 minutes reviewing the chart, personally assessing objective testing, direct patient care, completing documentation and billing.    The longitudinal plan of care for the diagnosis(es)/condition(s) as documented were addressed during this visit. Due to the added complexity in care, I will continue to support Kindra in the subsequent management and with ongoing continuity of care.      Again, thank you for allowing me to participate in the care of your patient.      Sincerely,    Iggy Leslie MD

## 2025-03-12 DIAGNOSIS — F02.80 ALZHEIMER'S DISEASE (H): ICD-10-CM

## 2025-03-12 DIAGNOSIS — G30.9 ALZHEIMER'S DISEASE (H): ICD-10-CM

## 2025-03-12 RX ORDER — DONEPEZIL HYDROCHLORIDE 5 MG/1
5 TABLET, FILM COATED ORAL AT BEDTIME
Qty: 30 TABLET | Refills: 10 | Status: SHIPPED | OUTPATIENT
Start: 2025-03-12

## 2025-03-12 NOTE — TELEPHONE ENCOUNTER
Last seen: 2/19/2025  RTC: one year from 02/19/2025  Cancel: Yes  No-show: No  Next appt: 02/20/2026    Incoming refill from patient.    Medication requested: donepezil (ARICEPT) 5 MG tablet   Directions: Take 1 tablet (5 mg) by mouth at bedime  Qty: 30 tablet with 10 refill.   Last refilled: 07/11/2024    Medication refill approved per refill protocol

## 2025-04-03 ENCOUNTER — VIRTUAL VISIT (OUTPATIENT)
Dept: NEUROLOGY | Facility: CLINIC | Age: 80
End: 2025-04-03
Payer: COMMERCIAL

## 2025-04-03 DIAGNOSIS — G40.919 INTRACTABLE EPILEPSY WITHOUT STATUS EPILEPTICUS, UNSPECIFIED EPILEPSY TYPE (H): ICD-10-CM

## 2025-04-03 RX ORDER — LACOSAMIDE 50 MG/1
100 TABLET ORAL 2 TIMES DAILY
Qty: 360 TABLET | Refills: 3 | Status: SHIPPED | OUTPATIENT
Start: 2025-04-03

## 2025-04-03 RX ORDER — LEVETIRACETAM 500 MG/1
1000 TABLET, FILM COATED, EXTENDED RELEASE ORAL 2 TIMES DAILY
Qty: 360 TABLET | Refills: 3 | Status: SHIPPED | OUTPATIENT
Start: 2025-04-03

## 2025-04-03 NOTE — PATIENT INSTRUCTIONS
Plan:  1. Continue Levetiracetam XR 1000 mg bid, Vimpat 100 mg bid.  2. Follow up  with Dr. Leslie for Alzheimer's disease dementia.   3. RTC in 3 months. Will check labs next visit: Irving Nieves  4. Seizure rescue - Valtoco 10 MG: Nostril spray.   5. Labs: Vimpat, Keppra.  6. RTC in 6 months.

## 2025-04-03 NOTE — NURSING NOTE
Is the patient currently in the state of [place]? YES    Visit mode:VIDEO    If the visit is dropped, the patient can be reconnected by: VIDEO VISIT: Send to e-mail at: [email or web address]    Will anyone else be joining the visit? YES: How would they like to receive their invitation?       How would you like to obtain your AVS? MyChart    Are changes needed to the allergy or medication list? NO    Reason for visit: Follow Up

## 2025-04-03 NOTE — PROGRESS NOTES
"  [facility]    Patient:  Kindra Holt  :  1945   Age:  78 year old   Today's Office Visit:  2025      HPI:   Video call for follow up. 80 yo famle with history of intractable seizures. She is joined by  Christiana in the clinic today. She was previously seen by Dr. Frazier. She was last seen about 4 months ago. She had no seizures. She started on Vimpat since the last visit. She is taking Vimpat 100 mg bid. No side effects were reported other than slightly tired. She was having once seizure per month before she started on Vimpat. Typical seizures will start with confusion, vomiting, seizures lasted 2 minutes, followed by fatigue and confusion\", she may lose bowel or bladder control. Seizure medications were not changed recently. Antiepileptic drug were not missed, no sleep deprivation, no excessive alcohol use or recreational drug use, no obvious trigger for seizure.     She was having 1-2 seizures a year before . This year she had total at 6 seizures.    Caregiver denies dizziness, no double vision, no nausea, no vomiting, no abdominal pain, no mood changes.      Seizure type:   Spell Type 1: These are described as  \"odd spells, vomited several times, does not respond, eyes were closed, increased drooling, very confused, did not follow commands, she is not able remember how to turn on faucet, speech slurred, stiffens\".  No clear warning sign. Prior to 2020 she had these spells daily.  Last spell was spring 2023, 2022, 2022 and prior to this 2021.    Seizure type 2: Convulsion - She had impetigo and had a seizure 3/2024. They went to ER twice in one day. I was not able find ER note in [facility] or care everywhere, they went to [facility]. Staff witness \"she fell, whole body convulsion, glasses broke, she had two in one day, seizures lasted 5 minutes\".    Current antiepileptic drug:   Levetiracetam XR 1000 mg bid  Vimpat 100 mg bid   " "  Past antiepileptic drug-   - Lamotrigine caused rash - stopped 7/2020 she developed lamotrigine induced maculopapular rash in sun exposed areas (face/neck/forearm) with itching, no bleeding or ulceration noted. After stopping lamotrigine her rash resolved.   - Oxcarbazepine cause severe mood issues in 12/2021 150 mg morning-300 mg twice a day: \"aggression, defiant, feisty, hallucinating, mean, more flashbacks, accusing housemates, hitting house mate, very agitated\". Weaned off oxcarbazepine.      Exam:   There were no vitals taken for this visit.    Neuro: Alert, orientated, speech is fluent, face is symmetric, extra-ocular movement in tact, no focal deficits noted. Green nail polish. Motor normal. Gait: normal. No ataxia.     Impression:   Recurrent stereotyped spells concerning for focal impaired seizure that progress to generalized tonic-clonic convulsion   History of DM and CKD  Alzheimer's disease dementia followed by Dr. Leslie  History of questionable TIA (slurred speech with no acute changes on MRI brain, these may have been seizures)     Discussion:   Epilepsy.   78 year old RHF with history of epilepsy, DM, new diagnosed Alzheimer's, and CKD and epilepsy.    She was last seen about 4 months ago. She had no seizures. She started on Vimpat since the last visit. She is taking Vimpat 100 mg bid. No side effects were reported other than slightly tired. She was having once seizure per month before she started on Vimpat. Christiana is very pleased with the results with Vimpat.    Her Guardian is Laura Anguiano, her niece.    Plan:  1. Continue Levetiracetam XR 1000 mg bid, Vimpat 100 mg bid.  2. Follow up  with Dr. Leslie for Alzheimer's disease dementia.   3. RTC in 3 months. Will check labs next visit: Irving Nieves  4. Seizure rescue - Valtoco 10 MG: Nostril spray. For seizure longer than 3 minutes or 2 seizure within 8 hour period spray 10 mg spray into one nostril. Maximum ONE doses in 24 hours or 4 doses in one " week. She is DNR and DNI.   5. Labs: Vimpat, Keppra.  6. RTC in 6 months.      The longitudinal plan of care for seizures was addressed during this visit. Due to the added complexity in care, I will continue to support this patient in the subsequent management of this condition and with the ongoing continuity of care of this condition.       22 min total time was spent on the day of this visit.      13 min was spent on face to face time  9 min was spent on preparation of visit to review charts and labs, ordering medications and tests, and documentation of clinical information

## 2025-04-03 NOTE — LETTER
"4/3/2025       RE: Kindra Holt  : 1945   MRN: [id number]      Dear Colleague,    Thank you for referring your patient, Kindra Holt, to the [facility] [place] [facility] at [facility]. Please see a copy of my visit note below.      [facility]    Patient:  Kindra Holt  :  1945   Age:  78 year old   Today's Office Visit:  2025      HPI:   Video call for follow up. 80 yo famle with history of intractable seizures. She is joined by  Christiana in the clinic today. She was previously seen by Dr. Frazier. She was last seen about 4 months ago. She had no seizures. She started on Vimpat since the last visit. She is taking Vimpat 100 mg bid. No side effects were reported other than slightly tired. She was having once seizure per month before she started on Vimpat. Typical seizures will start with confusion, vomiting, seizures lasted 2 minutes, followed by fatigue and confusion\", she may lose bowel or bladder control. Seizure medications were not changed recently. Antiepileptic drug were not missed, no sleep deprivation, no excessive alcohol use or recreational drug use, no obvious trigger for seizure.     She was having 1-2 seizures a year before . This year she had total at 6 seizures.    Caregiver denies dizziness, no double vision, no nausea, no vomiting, no abdominal pain, no mood changes.      Seizure type:   Spell Type 1: These are described as  \"odd spells, vomited several times, does not respond, eyes were closed, increased drooling, very confused, did not follow commands, she is not able remember how to turn on faucet, speech slurred, stiffens\".  No clear warning sign. Prior to 2020 she had these spells daily.  Last spell was spring 2023, 2022, 2022 and prior to this 2021.    Seizure type 2: Convulsion - She had impetigo and had a seizure 3/2024. They went to ER " "twice in one day. I was not able find ER note in [facility] or care everywhere, they went to [facility]. Staff witness \"she fell, whole body convulsion, glasses broke, she had two in one day, seizures lasted 5 minutes\".    Current antiepileptic drug:   Levetiracetam XR 1000 mg bid  Vimpat 100 mg bid     Past antiepileptic drug-   - Lamotrigine caused rash - stopped 7/2020 she developed lamotrigine induced maculopapular rash in sun exposed areas (face/neck/forearm) with itching, no bleeding or ulceration noted. After stopping lamotrigine her rash resolved.   - Oxcarbazepine cause severe mood issues in 12/2021 150 mg morning-300 mg twice a day: \"aggression, defiant, feisty, hallucinating, mean, more flashbacks, accusing housemates, hitting house mate, very agitated\". Weaned off oxcarbazepine.      Exam:   There were no vitals taken for this visit.    Neuro: Alert, orientated, speech is fluent, face is symmetric, extra-ocular movement in tact, no focal deficits noted. Green nail polish. Motor normal. Gait: normal. No ataxia.     Impression:   Recurrent stereotyped spells concerning for focal impaired seizure that progress to generalized tonic-clonic convulsion   History of DM and CKD  Alzheimer's disease dementia followed by Dr. Leslie  History of questionable TIA (slurred speech with no acute changes on MRI brain, these may have been seizures)     Discussion:   Epilepsy.   78 year old RHF with history of epilepsy, DM, new diagnosed Alzheimer's, and CKD and epilepsy.    She was last seen about 4 months ago. She had no seizures. She started on Vimpat since the last visit. She is taking Vimpat 100 mg bid. No side effects were reported other than slightly tired. She was having once seizure per month before she started on Vimpat. Christiana is very pleased with the results with Vimpat.    Her Guardian is Laura Anguiano, her niece.    Plan:  1. Continue Levetiracetam XR 1000 mg bid, Vimpat 100 mg bid.  2. Follow up  with Dr." Prasanth for Alzheimer's disease dementia.   3. RTC in 3 months. Will check labs next visit: Irving Nieves  4. Seizure rescue - Valtoco 10 MG: Nostril spray. For seizure longer than 3 minutes or 2 seizure within 8 hour period spray 10 mg spray into one nostril. Maximum ONE doses in 24 hours or 4 doses in one week. She is DNR and DNI.   5. Labs: Vimpat, Keppra.  6. RTC in 6 months.      The longitudinal plan of care for seizures was addressed during this visit. Due to the added complexity in care, I will continue to support this patient in the subsequent management of this condition and with the ongoing continuity of care of this condition.       22 min total time was spent on the day of this visit.      13 min was spent on face to face time  9 min was spent on preparation of visit to review charts and labs, ordering medications and tests, and documentation of clinical information      Again, thank you for allowing me to participate in the care of your patient.      Sincerely,    Kenzie Delcid MD

## 2025-04-03 NOTE — NURSING NOTE
Virtual Visit Details    Type of service:  Video Visit   Video Start Time:  11 AM  Video End Time: 11:30 AM    Originating Location (pt. Location): Assisted Living    Distant Location (provider location):  On-site  Platform used for Video Visit: Mar

## 2025-05-05 DIAGNOSIS — G40.919 INTRACTABLE EPILEPSY WITHOUT STATUS EPILEPTICUS, UNSPECIFIED EPILEPSY TYPE (H): ICD-10-CM

## 2025-05-07 RX ORDER — LACOSAMIDE 50 MG/1
100 TABLET ORAL 2 TIMES DAILY
Qty: 120 TABLET | Refills: 5 | OUTPATIENT
Start: 2025-05-07

## 2025-05-07 NOTE — TELEPHONE ENCOUNTER
lacosamide (VIMPAT) 50 MG TABS tablet 360 tablet 3 4/3/2025       Should have refills on file. Pharmacy sent message. Rx refill denied    Loyda Whitley RN  P Red Flag Triage/MRT

## 2025-05-08 ENCOUNTER — MYC MEDICAL ADVICE (OUTPATIENT)
Dept: NEUROLOGY | Facility: CLINIC | Age: 80
End: 2025-05-08

## 2025-05-08 DIAGNOSIS — G40.919 INTRACTABLE EPILEPSY WITHOUT STATUS EPILEPTICUS, UNSPECIFIED EPILEPSY TYPE (H): ICD-10-CM

## 2025-05-08 RX ORDER — LACOSAMIDE 50 MG/1
100 TABLET ORAL 2 TIMES DAILY
Qty: 360 TABLET | Refills: 1 | Status: SHIPPED | OUTPATIENT
Start: 2025-05-08

## 2025-05-08 NOTE — TELEPHONE ENCOUNTER
Patient is out of medication. 4/3 prescription failed transmission. No refills on file with pharmacy.

## 2025-06-04 ENCOUNTER — MYC MEDICAL ADVICE (OUTPATIENT)
Dept: NEUROLOGY | Facility: CLINIC | Age: 80
End: 2025-06-04

## 2025-07-03 ENCOUNTER — VIRTUAL VISIT (OUTPATIENT)
Dept: NEUROLOGY | Facility: CLINIC | Age: 80
End: 2025-07-03
Payer: COMMERCIAL

## 2025-07-03 DIAGNOSIS — E13.69 OTHER SPECIFIED DIABETES MELLITUS WITH OTHER SPECIFIED COMPLICATION, UNSPECIFIED WHETHER LONG TERM INSULIN USE (H): Primary | ICD-10-CM

## 2025-07-03 NOTE — PROGRESS NOTES
"  [facility]    Patient:  Kindra Holt  :  1945   Age:  78 year old   Today's Office Visit:  2025      HPI:   Video call for follow up. 78 yo famle with history of intractable seizures. She was last seen 3 months ago.     She is joined by  Christiana in the clinic today. For the past 3 months, she had no seizures. Her last seizure was about one year ago. She started on Vimpat in . She is taking Levetiracetam XR 1000 mg bid, Vimpat 100 mg bid. No side effects were reported other than slightly tired. She was having once seizure per month before she started on Vimpat.     Typical seizures will start with confusion, vomiting, seizures lasted 2 minutes, followed by fatigue and confusion\", she may lose bowel or bladder control. Seizure medications were not changed recently. Antiepileptic drug were not missed, no sleep deprivation, no excessive alcohol use or recreational drug use, no obvious trigger for seizure.     She was having 1-2 seizures a year before . This year she had total at 6 seizures.    Caregiver denies dizziness, no double vision, no nausea, no vomiting, no abdominal pain, no mood changes.      Seizure type:   Spell Type 1: These are described as  \"odd spells, vomited several times, does not respond, eyes were closed, increased drooling, very confused, did not follow commands, she is not able remember how to turn on faucet, speech slurred, stiffens\".  No clear warning sign. Prior to 2020 she had these spells daily.  Last spell was spring 2023, 2022, 2022 and prior to this 2021.    Seizure type 2: Convulsion - She had impetigo and had a seizure 3/2024. They went to ER twice in one day. I was not able find ER note in [facility] or care everywhere, they went to [facility]. Staff witness \"she fell, whole body convulsion, glasses broke, she had two in one day, seizures lasted 5 minutes\".    Current antiepileptic drug: " "  Levetiracetam XR 1000 mg bid  Vimpat 100 mg bid     Past antiepileptic drug-   - Lamotrigine caused rash - stopped 7/2020 she developed lamotrigine induced maculopapular rash in sun exposed areas (face/neck/forearm) with itching, no bleeding or ulceration noted. After stopping lamotrigine her rash resolved.   - Oxcarbazepine cause severe mood issues in 12/2021 150 mg morning-300 mg twice a day: \"aggression, defiant, feisty, hallucinating, mean, more flashbacks, accusing housemates, hitting house mate, very agitated\". Weaned off oxcarbazepine.      Exam:   There were no vitals taken for this visit.    Neuro: Alert, orientated, speech is fluent, face is symmetric, extra-ocular movement in tact, no focal deficits noted.      Impression:   Recurrent stereotyped spells concerning for focal impaired seizure that progress to generalized tonic-clonic convulsion   History of DM and CKD  Alzheimer's disease dementia followed by Dr. Leslie  History of questionable TIA (slurred speech with no acute changes on MRI brain, these may have been seizures)     Discussion:   Epilepsy.   78 year old RHF with history of epilepsy, DM, new diagnosed Alzheimer's, and CKD and epilepsy.    She was last seen about 4 months ago. She had no seizures. She started on Vimpat since the last visit. She is taking Vimpat 100 mg bid. No side effects were reported other than slightly tired. She was having once seizure per month before she started on Vimpat. Christiana is very pleased with the results with Vimpat.    Her Guardian is Laura Anguiano, her niece.    Plan:  1. Continue Levetiracetam XR 1000 mg bid, Vimpat 100 mg bid.  2. She will follow up with Dr. Leslie for Alzheimer's disease dementia in Feb. 2026.   3. Seizure rescue - Valtoco 10 MG: Nostril spray. For seizure longer than 3 minutes or 2 seizure within 8 hour period spray 10 mg spray into one nostril. Maximum ONE doses in 24 hours or 4 doses in one week. She is DNR and DNI.   4. Labs next visit: " Vimpat, Keppra.  5. RTC in 6 months.      The longitudinal plan of care for seizures was addressed during this visit. Due to the added complexity in care, I will continue to support this patient in the subsequent management of this condition and with the ongoing continuity of care of this condition.       21 min total time was spent on the day of this visit.      9 min was spent on face to face time  12 min was spent on preparation of visit to review charts and labs, ordering medications and tests, and documentation of clinical information

## 2025-07-03 NOTE — PROGRESS NOTES
Virtual Visit Details    Type of service:  Video Visit   Video Start Time: 3:44 PM  Video End Time: 4: 53 PM    Originating Location (pt. Location): Long term Care    Distant Location (provider location):  On-site  Platform used for Video Visit: Mar

## 2025-07-03 NOTE — LETTER
"7/3/2025       RE: Kindra Holt  : 1945   MRN: [id number]      Dear Colleague,    Thank you for referring your patient, Kindra Holt, to the [facility] EPILEPSY [facility] at [facility]. Please see a copy of my visit note below.      [facility] Epilepsy Care Progress Note    Patient:  Kindra Holt  :  1945   Age:  78 year old   Today's Office Visit:  2025      HPI:   Video call for follow up. 80 yo famle with history of intractable seizures. She was last seen 3 months ago.     She is joined by  Christiana in the clinic today. For the past 3 months, she had no seizures. Her last seizure was about one year ago. She started on Vimpat in . She is taking Levetiracetam XR 1000 mg bid, Vimpat 100 mg bid. No side effects were reported other than slightly tired. She was having once seizure per month before she started on Vimpat.     Typical seizures will start with confusion, vomiting, seizures lasted 2 minutes, followed by fatigue and confusion\", she may lose bowel or bladder control. Seizure medications were not changed recently. Antiepileptic drug were not missed, no sleep deprivation, no excessive alcohol use or recreational drug use, no obvious trigger for seizure.     She was having 1-2 seizures a year before . This year she had total at 6 seizures.    Caregiver denies dizziness, no double vision, no nausea, no vomiting, no abdominal pain, no mood changes.      Seizure type:   Spell Type 1: These are described as  \"odd spells, vomited several times, does not respond, eyes were closed, increased drooling, very confused, did not follow commands, she is not able remember how to turn on faucet, speech slurred, stiffens\".  No clear warning sign. Prior to 2020 she had these spells daily.  Last spell was spring 2023, 2022, 2022 and prior to this 2021.    Seizure type 2: Convulsion - She had impetigo " "and had a seizure 3/2024. They went to ER twice in one day. I was not able find ER note in [facility] or care everywhere, they went to [facility] and [facility]. Staff witness \"she fell, whole body convulsion, glasses broke, she had two in one day, seizures lasted 5 minutes\".    Current antiepileptic drug:   Levetiracetam XR 1000 mg bid  Vimpat 100 mg bid     Past antiepileptic drug-   - Lamotrigine caused rash - stopped 7/2020 she developed lamotrigine induced maculopapular rash in sun exposed areas (face/neck/forearm) with itching, no bleeding or ulceration noted. After stopping lamotrigine her rash resolved.   - Oxcarbazepine cause severe mood issues in 12/2021 150 mg morning-300 mg twice a day: \"aggression, defiant, feisty, hallucinating, mean, more flashbacks, accusing housemates, hitting house mate, very agitated\". Weaned off oxcarbazepine.      Exam:   There were no vitals taken for this visit.    Neuro: Alert, orientated, speech is fluent, face is symmetric, extra-ocular movement in tact, no focal deficits noted.      Impression:   Recurrent stereotyped spells concerning for focal impaired seizure that progress to generalized tonic-clonic convulsion   History of DM and CKD  Alzheimer's disease dementia followed by Dr. Leslie  History of questionable TIA (slurred speech with no acute changes on MRI brain, these may have been seizures)     Discussion:   Epilepsy.   78 year old RHF with history of epilepsy, DM, new diagnosed Alzheimer's, and CKD and epilepsy.    She was last seen about 4 months ago. She had no seizures. She started on Vimpat since the last visit. She is taking Vimpat 100 mg bid. No side effects were reported other than slightly tired. She was having once seizure per month before she started on Vimpat. Christiana is very pleased with the results with Vimpat.    Her Guardian is Laura Anguiano, her niece.    Plan:  1. Continue Levetiracetam XR 1000 mg bid, Vimpat 100 mg bid.  2. She will follow up with Dr. Leslie for " Alzheimer's disease dementia in Feb. 2026.   3. Seizure rescue - Valtoco 10 MG: Nostril spray. For seizure longer than 3 minutes or 2 seizure within 8 hour period spray 10 mg spray into one nostril. Maximum ONE doses in 24 hours or 4 doses in one week. She is DNR and DNI.   4. Labs next visit: Vimpat, Keppra.  5. RTC in 6 months.      The longitudinal plan of care for seizures was addressed during this visit. Due to the added complexity in care, I will continue to support this patient in the subsequent management of this condition and with the ongoing continuity of care of this condition.       21 min total time was spent on the day of this visit.      9 min was spent on face to face time  12 min was spent on preparation of visit to review charts and labs, ordering medications and tests, and documentation of clinical information    Virtual Visit Details    Type of service:  Video Visit   Video Start Time: {video visit start/end time for provider to select:046394}  Video End Time:{video visit start/end time for provider to select:[id number]}    Originating Location (pt. Location): Long term Care  {PROVIDER LOCATION On-site should be selected for visits conducted from your clinic location or adjoining [facility] hospital, academic office, or other nearby [facility] building. Off-site should be selected for all other provider locations, including home:[id number]}  Distant Location (provider location):  On-site  Platform used for Video Visit: Mar     Again, thank you for allowing me to participate in the care of your patient.      Sincerely,    Kenzie Delcid MD

## 2025-07-19 ENCOUNTER — HEALTH MAINTENANCE LETTER (OUTPATIENT)
Age: 80
End: 2025-07-19

## 2025-08-05 ENCOUNTER — TELEPHONE (OUTPATIENT)
Dept: NEUROLOGY | Facility: CLINIC | Age: 80
End: 2025-08-05